# Patient Record
Sex: FEMALE | Race: BLACK OR AFRICAN AMERICAN | NOT HISPANIC OR LATINO
[De-identification: names, ages, dates, MRNs, and addresses within clinical notes are randomized per-mention and may not be internally consistent; named-entity substitution may affect disease eponyms.]

---

## 2017-01-05 ENCOUNTER — MEDICATION RENEWAL (OUTPATIENT)
Age: 67
End: 2017-01-05

## 2017-03-09 ENCOUNTER — APPOINTMENT (OUTPATIENT)
Dept: ENDOCRINOLOGY | Facility: CLINIC | Age: 67
End: 2017-03-09

## 2017-03-09 VITALS
WEIGHT: 178 LBS | BODY MASS INDEX: 31.54 KG/M2 | HEART RATE: 81 BPM | OXYGEN SATURATION: 98 % | HEIGHT: 63 IN | DIASTOLIC BLOOD PRESSURE: 82 MMHG | SYSTOLIC BLOOD PRESSURE: 126 MMHG

## 2017-03-10 LAB
CREAT SPEC-SCNC: 19 MG/DL
MICROALBUMIN 24H UR DL<=1MG/L-MCNC: 1.1 MG/DL
MICROALBUMIN/CREAT 24H UR-RTO: 58 UG/MG

## 2017-08-03 ENCOUNTER — EMERGENCY (EMERGENCY)
Facility: HOSPITAL | Age: 67
LOS: 1 days | Discharge: HOME | End: 2017-08-03
Attending: EMERGENCY MEDICINE
Payer: MEDICARE

## 2017-08-03 VITALS
HEART RATE: 76 BPM | SYSTOLIC BLOOD PRESSURE: 170 MMHG | DIASTOLIC BLOOD PRESSURE: 94 MMHG | OXYGEN SATURATION: 96 % | RESPIRATION RATE: 20 BRPM | TEMPERATURE: 100 F

## 2017-08-03 VITALS
SYSTOLIC BLOOD PRESSURE: 163 MMHG | HEART RATE: 67 BPM | DIASTOLIC BLOOD PRESSURE: 80 MMHG | TEMPERATURE: 99 F | RESPIRATION RATE: 20 BRPM | OXYGEN SATURATION: 100 %

## 2017-08-03 PROCEDURE — 99283 EMERGENCY DEPT VISIT LOW MDM: CPT

## 2017-08-03 PROCEDURE — 99283 EMERGENCY DEPT VISIT LOW MDM: CPT | Mod: 25

## 2017-08-03 RX ORDER — AZITHROMYCIN 500 MG/1
500 TABLET, FILM COATED ORAL ONCE
Qty: 0 | Refills: 0 | Status: COMPLETED | OUTPATIENT
Start: 2017-08-03 | End: 2017-08-03

## 2017-08-03 RX ORDER — AZITHROMYCIN 500 MG/1
1 TABLET, FILM COATED ORAL
Qty: 2 | Refills: 0 | OUTPATIENT
Start: 2017-08-03 | End: 2017-08-05

## 2017-08-03 RX ORDER — OXYCODONE HYDROCHLORIDE 5 MG/1
5 TABLET ORAL ONCE
Qty: 0 | Refills: 0 | Status: DISCONTINUED | OUTPATIENT
Start: 2017-08-03 | End: 2017-08-03

## 2017-08-03 RX ORDER — OXYCODONE HYDROCHLORIDE 5 MG/1
1 TABLET ORAL
Qty: 12 | Refills: 0 | OUTPATIENT
Start: 2017-08-03 | End: 2017-08-06

## 2017-08-03 RX ADMIN — AZITHROMYCIN 500 MILLIGRAM(S): 500 TABLET, FILM COATED ORAL at 05:30

## 2017-08-03 RX ADMIN — OXYCODONE HYDROCHLORIDE 5 MILLIGRAM(S): 5 TABLET ORAL at 03:48

## 2017-08-03 NOTE — ED PROVIDER NOTE - ENMT, MLM
Airway patent, Nasal mucosa clear. Mouth with normal mucosa. Throat has no vesicles, no oropharyngeal exudates and uvula is midline. Right lower posterior gum line oozing fresh blood and suture posterior to last molar.

## 2017-08-03 NOTE — ED ADULT NURSE NOTE - DISCHARGE TEACHING
Reviewed discharge instructions, follow up care, and when to return to ED - Pt verbalized understanding.

## 2017-08-03 NOTE — ED PROVIDER NOTE - OBJECTIVE STATEMENT
s/p right lower posterior molar extraction today. given tylenol only. given clindamycin a few days ago which gave her pain so she stopped taking. s/p right lower posterior molar extraction today. Gradual onset and worsening "aching" pain. Given Tylenol only for pain. Prescribed prophylactic clindamycin a few days ago which gave her pain so she stopped taking. Able to swallow. No fever. No drooling. No trouble breathing.

## 2017-08-03 NOTE — ED PROVIDER NOTE - MEDICAL DECISION MAKING DETAILS
Dental pain s/p tooth extraction. No respiratory problems. Afebrile. PO Oxycodone given in ED with improvement.

## 2017-08-03 NOTE — ED ADULT NURSE NOTE - PMH
Adult Hypothyroidism    Asthma    CAD (Coronary Artery Disease)    Cardiomegaly    Chronic CHF    CVA (Cerebral Infarction)  CVA x 2  2007 and 10/2013  DJD (degenerative joint disease) of lumbar spine    DM (Diabetes Mellitus Screen)    Dysthymia    GERD (gastroesophageal reflux disease)    HTN (Hypertension)    Hyperlipidemia    Hyperuricemia    MI (myocardial infarction)  1993, 2011  NSTEMI (non-ST elevation myocardial infarction)    PVD (peripheral vascular disease)    Thalassemia    Vitamin D deficiency

## 2017-08-03 NOTE — ED ADULT NURSE NOTE - OBJECTIVE STATEMENT
67 year old female  2 days post tooth extraction presents to he ED complaining of tooth pain. pt is A&O x 4, VSS, afebrile. Pt has a defibrilator in place and a Hx of stroke and MO, at this time Pt denies Nausea, SOB, or CP. Pt is A&O x 4, VSS, afebrile, ambulating independently. 67 year old female  2 days post tooth extraction presents to he ED complaining of tooth pain. pt is A&O x 4, VSS, afebrile. Pt has a defibrillator in place and a Hx of stroke and MO, at this time Pt denies Nausea, SOB, or CP. Pt is A&O x 4, VSS, afebrile, ambulating independently. Pt states that her cardiologist cleared her for the extraction and prescribed her antibiotics to be taken for the procedure that she didn't take because she was allergic to them.

## 2017-08-10 ENCOUNTER — APPOINTMENT (OUTPATIENT)
Dept: ENDOCRINOLOGY | Facility: CLINIC | Age: 67
End: 2017-08-10
Payer: MEDICARE

## 2017-08-10 VITALS
HEIGHT: 63 IN | HEART RATE: 79 BPM | OXYGEN SATURATION: 97 % | DIASTOLIC BLOOD PRESSURE: 76 MMHG | BODY MASS INDEX: 31.71 KG/M2 | WEIGHT: 179 LBS | SYSTOLIC BLOOD PRESSURE: 126 MMHG

## 2017-08-10 DIAGNOSIS — E11.40 TYPE 2 DIABETES MELLITUS WITH DIABETIC NEUROPATHY, UNSPECIFIED: ICD-10-CM

## 2017-08-10 LAB
GLUCOSE BLDC GLUCOMTR-MCNC: 100
HBA1C MFR BLD HPLC: 7.5

## 2017-08-10 PROCEDURE — 99214 OFFICE O/P EST MOD 30 MIN: CPT | Mod: 25

## 2017-08-10 PROCEDURE — 83036 HEMOGLOBIN GLYCOSYLATED A1C: CPT | Mod: QW

## 2017-08-10 PROCEDURE — 82962 GLUCOSE BLOOD TEST: CPT

## 2017-10-29 ENCOUNTER — EMERGENCY (EMERGENCY)
Facility: HOSPITAL | Age: 67
LOS: 0 days | Discharge: HOME | End: 2017-10-29
Attending: EMERGENCY MEDICINE
Payer: MEDICARE

## 2017-10-29 VITALS
DIASTOLIC BLOOD PRESSURE: 76 MMHG | SYSTOLIC BLOOD PRESSURE: 140 MMHG | RESPIRATION RATE: 16 BRPM | HEART RATE: 60 BPM | TEMPERATURE: 98 F | OXYGEN SATURATION: 99 %

## 2017-10-29 VITALS
WEIGHT: 184.97 LBS | OXYGEN SATURATION: 99 % | HEIGHT: 63 IN | TEMPERATURE: 98 F | HEART RATE: 68 BPM | SYSTOLIC BLOOD PRESSURE: 133 MMHG | RESPIRATION RATE: 18 BRPM | DIASTOLIC BLOOD PRESSURE: 75 MMHG

## 2017-10-29 PROCEDURE — 99284 EMERGENCY DEPT VISIT MOD MDM: CPT

## 2017-10-29 PROCEDURE — 70450 CT HEAD/BRAIN W/O DYE: CPT | Mod: 26

## 2017-10-29 PROCEDURE — 73564 X-RAY EXAM KNEE 4 OR MORE: CPT | Mod: 26,LT

## 2017-10-29 NOTE — ED PROVIDER NOTE - OBJECTIVE STATEMENT
68 y/o female hx htn, dm, cad/cva, afib on xarelto and with defibrillator c/o mechanical fall today while walking. States left knee gave out and fell face forward and hit front of face. No loc, no dizziness, neck pain, neuro symptoms. Pt central right maxillary denture fell out which pt has with her, no other facial trauma. No vision changes, no cp, sob, abd pain, hip pain, extremity pain other than at left knee. Pt c/o left knee pain ,no other distal/proximal pain. Stood up after accident but with pain.

## 2017-10-29 NOTE — ED PROVIDER NOTE - CARE PLAN
Principal Discharge DX:	Head injury  Secondary Diagnosis:	Other closed fracture of left patella, initial encounter

## 2017-10-29 NOTE — ED ADULT TRIAGE NOTE - CHIEF COMPLAINT QUOTE
c/o l knee pain/swelling and forehead pain s/p trip and fall at store denies loc or dizziness hit forehead, nose and r front tooth dislodged pt on xarelto

## 2017-10-29 NOTE — ED PROVIDER NOTE - MEDICAL DECISION MAKING DETAILS
mechanical fall, on xarelto, no head trauma but ct head given a/c. left knee pain/swelling, no sign of knee dislocation. will get xray. has likely effusion. possibly ligamentous damage though no sign of significant laxity. perfused distally. possibly immobilize and crutches with ortho f/u if neg.

## 2017-10-29 NOTE — ED PROVIDER NOTE - PROGRESS NOTE DETAILS
Divya: explained xray results to pt, explained would like to do ct of knee, pt refusing, states will just follow up. given ct results, knee immobilizer, crutch instructions, orthopedics. return precautions. tylenol/motrin/ice. will f/u. no weight bearing until see ortho.

## 2017-10-30 DIAGNOSIS — I25.2 OLD MYOCARDIAL INFARCTION: ICD-10-CM

## 2017-10-30 DIAGNOSIS — I50.9 HEART FAILURE, UNSPECIFIED: ICD-10-CM
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
## 2017-10-30 DIAGNOSIS — S82.002A UNSPECIFIED FRACTURE OF LEFT PATELLA, INITIAL ENCOUNTER FOR CLOSED FRACTURE: ICD-10-CM

## 2017-10-30 DIAGNOSIS — M25.562 PAIN IN LEFT KNEE: ICD-10-CM
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
## 2017-10-30 DIAGNOSIS — J45.909 UNSPECIFIED ASTHMA, UNCOMPLICATED: ICD-10-CM

## 2017-10-30 DIAGNOSIS — E03.9 HYPOTHYROIDISM, UNSPECIFIED: ICD-10-CM

## 2017-10-30 DIAGNOSIS — I25.9 CHRONIC ISCHEMIC HEART DISEASE, UNSPECIFIED: ICD-10-CM

## 2017-10-30 DIAGNOSIS — E78.5 HYPERLIPIDEMIA, UNSPECIFIED: ICD-10-CM

## 2017-10-30 DIAGNOSIS — S09.90XA UNSPECIFIED INJURY OF HEAD, INITIAL ENCOUNTER: ICD-10-CM

## 2017-10-30 DIAGNOSIS — I10 ESSENTIAL (PRIMARY) HYPERTENSION: ICD-10-CM

## 2017-10-30 DIAGNOSIS — K21.9 GASTRO-ESOPHAGEAL REFLUX DISEASE WITHOUT ESOPHAGITIS: ICD-10-CM

## 2017-10-30 DIAGNOSIS — Z79.2 LONG TERM (CURRENT) USE OF ANTIBIOTICS: ICD-10-CM

## 2017-10-30 DIAGNOSIS — Z88.0 ALLERGY STATUS TO PENICILLIN: ICD-10-CM

## 2017-10-30 DIAGNOSIS — Z79.84 LONG TERM (CURRENT) USE OF ORAL HYPOGLYCEMIC DRUGS: ICD-10-CM

## 2017-10-31 NOTE — ED PROCEDURE NOTE - NS ED PERI VASCULAR NEG
no paresthesia/capillary refill time < 2 seconds/fingers/toes warm to touch/no swelling/no cyanosis of extremity

## 2017-11-06 ENCOUNTER — APPOINTMENT (OUTPATIENT)
Dept: ORTHOPEDIC SURGERY | Facility: CLINIC | Age: 67
End: 2017-11-06
Payer: MEDICARE

## 2017-11-06 VITALS
HEIGHT: 65 IN | DIASTOLIC BLOOD PRESSURE: 80 MMHG | WEIGHT: 187 LBS | BODY MASS INDEX: 31.16 KG/M2 | HEART RATE: 74 BPM | SYSTOLIC BLOOD PRESSURE: 144 MMHG

## 2017-11-06 PROCEDURE — 73564 X-RAY EXAM KNEE 4 OR MORE: CPT | Mod: LT

## 2017-11-06 PROCEDURE — 99204 OFFICE O/P NEW MOD 45 MIN: CPT

## 2017-11-08 ENCOUNTER — CHART COPY (OUTPATIENT)
Age: 67
End: 2017-11-08

## 2017-11-20 ENCOUNTER — APPOINTMENT (OUTPATIENT)
Dept: ORTHOPEDIC SURGERY | Facility: CLINIC | Age: 67
End: 2017-11-20
Payer: MEDICARE

## 2017-11-20 VITALS — HEIGHT: 65 IN | BODY MASS INDEX: 31.16 KG/M2 | WEIGHT: 187 LBS

## 2017-11-20 PROCEDURE — 99214 OFFICE O/P EST MOD 30 MIN: CPT

## 2017-11-20 PROCEDURE — 73564 X-RAY EXAM KNEE 4 OR MORE: CPT | Mod: LT

## 2017-12-12 ENCOUNTER — APPOINTMENT (OUTPATIENT)
Dept: ORTHOPEDIC SURGERY | Facility: CLINIC | Age: 67
End: 2017-12-12
Payer: MEDICARE

## 2017-12-12 VITALS
WEIGHT: 187 LBS | SYSTOLIC BLOOD PRESSURE: 160 MMHG | HEIGHT: 65 IN | HEART RATE: 80 BPM | DIASTOLIC BLOOD PRESSURE: 80 MMHG | BODY MASS INDEX: 31.16 KG/M2

## 2017-12-12 PROCEDURE — 73564 X-RAY EXAM KNEE 4 OR MORE: CPT | Mod: LT

## 2017-12-12 PROCEDURE — 99214 OFFICE O/P EST MOD 30 MIN: CPT

## 2017-12-28 ENCOUNTER — APPOINTMENT (OUTPATIENT)
Dept: ENDOCRINOLOGY | Facility: CLINIC | Age: 67
End: 2017-12-28

## 2018-01-17 ENCOUNTER — RX RENEWAL (OUTPATIENT)
Age: 68
End: 2018-01-17

## 2018-01-24 ENCOUNTER — APPOINTMENT (OUTPATIENT)
Dept: ORTHOPEDIC SURGERY | Facility: CLINIC | Age: 68
End: 2018-01-24
Payer: MEDICARE

## 2018-01-24 VITALS
HEART RATE: 83 BPM | HEIGHT: 65 IN | DIASTOLIC BLOOD PRESSURE: 83 MMHG | WEIGHT: 182 LBS | SYSTOLIC BLOOD PRESSURE: 125 MMHG | BODY MASS INDEX: 30.32 KG/M2

## 2018-01-24 PROCEDURE — 99213 OFFICE O/P EST LOW 20 MIN: CPT

## 2018-01-24 PROCEDURE — 73564 X-RAY EXAM KNEE 4 OR MORE: CPT | Mod: LT

## 2018-02-13 ENCOUNTER — APPOINTMENT (OUTPATIENT)
Dept: ORTHOPEDIC SURGERY | Facility: CLINIC | Age: 68
End: 2018-02-13

## 2018-03-08 ENCOUNTER — APPOINTMENT (OUTPATIENT)
Dept: ENDOCRINOLOGY | Facility: CLINIC | Age: 68
End: 2018-03-08
Payer: MEDICARE

## 2018-03-08 VITALS
WEIGHT: 179 LBS | DIASTOLIC BLOOD PRESSURE: 72 MMHG | SYSTOLIC BLOOD PRESSURE: 118 MMHG | BODY MASS INDEX: 29.82 KG/M2 | OXYGEN SATURATION: 97 % | HEIGHT: 65 IN | HEART RATE: 85 BPM

## 2018-03-08 PROCEDURE — 99215 OFFICE O/P EST HI 40 MIN: CPT

## 2018-03-08 RX ORDER — HYDROCODONE BITARTRATE AND ACETAMINOPHEN 5; 300 MG/1; MG/1
5-300 TABLET ORAL
Qty: 18 | Refills: 0 | Status: DISCONTINUED | COMMUNITY
Start: 2017-08-04 | End: 2018-03-08

## 2018-03-08 RX ORDER — SACUBITRIL AND VALSARTAN 24; 26 MG/1; MG/1
24-26 TABLET, FILM COATED ORAL
Qty: 4 | Refills: 0 | Status: DISCONTINUED | COMMUNITY
Start: 2017-08-26 | End: 2018-03-08

## 2018-03-08 RX ORDER — CLINDAMYCIN HYDROCHLORIDE 150 MG/1
150 CAPSULE ORAL EVERY 6 HOURS
Qty: 28 | Refills: 0 | Status: DISCONTINUED | COMMUNITY
Start: 2017-07-28 | End: 2018-03-08

## 2018-03-08 RX ORDER — OXYCODONE HYDROCHLORIDE 5 MG/1
5 CAPSULE ORAL
Qty: 12 | Refills: 0 | Status: DISCONTINUED | COMMUNITY
Start: 2017-08-03 | End: 2018-03-08

## 2018-03-08 RX ORDER — AZITHROMYCIN 250 MG/1
250 TABLET, FILM COATED ORAL
Qty: 6 | Refills: 0 | Status: DISCONTINUED | COMMUNITY
Start: 2017-08-03 | End: 2018-03-08

## 2018-03-08 RX ORDER — AZITHROMYCIN 500 MG/1
500 TABLET, FILM COATED ORAL
Qty: 2 | Refills: 0 | Status: DISCONTINUED | COMMUNITY
Start: 2017-08-03 | End: 2018-03-08

## 2018-03-27 ENCOUNTER — APPOINTMENT (OUTPATIENT)
Dept: ORTHOPEDIC SURGERY | Facility: CLINIC | Age: 68
End: 2018-03-27
Payer: MEDICARE

## 2018-03-27 VITALS — HEIGHT: 65 IN | WEIGHT: 180 LBS | BODY MASS INDEX: 29.99 KG/M2

## 2018-03-27 DIAGNOSIS — M25.561 PAIN IN RIGHT KNEE: ICD-10-CM

## 2018-03-27 DIAGNOSIS — G89.29 PAIN IN RIGHT KNEE: ICD-10-CM

## 2018-03-27 DIAGNOSIS — S82.045D NONDISPLACED COMMINUTED FRACTURE OF LEFT PATELLA, SUBSEQUENT ENCOUNTER FOR CLOSED FRACTURE WITH ROUTINE HEALING: ICD-10-CM

## 2018-03-27 DIAGNOSIS — M25.562 PAIN IN RIGHT KNEE: ICD-10-CM

## 2018-03-27 DIAGNOSIS — S82.035A NONDISPLACED TRANSVERSE FRACTURE OF LEFT PATELLA, INITIAL ENCOUNTER FOR CLOSED FRACTURE: ICD-10-CM

## 2018-03-27 PROCEDURE — 73564 X-RAY EXAM KNEE 4 OR MORE: CPT | Mod: LT

## 2018-03-27 PROCEDURE — 99214 OFFICE O/P EST MOD 30 MIN: CPT

## 2018-06-20 ENCOUNTER — MEDICATION RENEWAL (OUTPATIENT)
Age: 68
End: 2018-06-20

## 2018-07-26 ENCOUNTER — MEDICATION RENEWAL (OUTPATIENT)
Age: 68
End: 2018-07-26

## 2018-09-13 ENCOUNTER — APPOINTMENT (OUTPATIENT)
Dept: ENDOCRINOLOGY | Facility: CLINIC | Age: 68
End: 2018-09-13
Payer: MEDICARE

## 2018-09-13 VITALS
HEART RATE: 80 BPM | WEIGHT: 181 LBS | SYSTOLIC BLOOD PRESSURE: 140 MMHG | OXYGEN SATURATION: 98 % | BODY MASS INDEX: 30.16 KG/M2 | DIASTOLIC BLOOD PRESSURE: 62 MMHG | HEIGHT: 65 IN

## 2018-09-13 VITALS — HEIGHT: 61.9 IN | WEIGHT: 176 LBS | BODY MASS INDEX: 32.39 KG/M2

## 2018-09-13 PROCEDURE — 77085 DXA BONE DENSITY AXL VRT FX: CPT

## 2018-09-13 PROCEDURE — 99215 OFFICE O/P EST HI 40 MIN: CPT | Mod: 25

## 2019-01-24 ENCOUNTER — INPATIENT (INPATIENT)
Facility: HOSPITAL | Age: 69
LOS: 6 days | Discharge: HOME | DRG: 291 | End: 2019-01-31
Attending: INTERNAL MEDICINE | Admitting: INTERNAL MEDICINE
Payer: MEDICARE

## 2019-01-24 VITALS
DIASTOLIC BLOOD PRESSURE: 94 MMHG | HEART RATE: 96 BPM | OXYGEN SATURATION: 98 % | RESPIRATION RATE: 26 BRPM | TEMPERATURE: 99 F | SYSTOLIC BLOOD PRESSURE: 161 MMHG | WEIGHT: 177.03 LBS | HEIGHT: 64 IN

## 2019-01-24 DIAGNOSIS — I50.43 ACUTE ON CHRONIC COMBINED SYSTOLIC (CONGESTIVE) AND DIASTOLIC (CONGESTIVE) HEART FAILURE: ICD-10-CM

## 2019-01-24 LAB
ALBUMIN SERPL ELPH-MCNC: 4.3 G/DL — SIGNIFICANT CHANGE UP (ref 3.3–5)
ALP SERPL-CCNC: 79 U/L — SIGNIFICANT CHANGE UP (ref 40–120)
ALT FLD-CCNC: 9 U/L — LOW (ref 10–45)
ANION GAP SERPL CALC-SCNC: 16 MMOL/L — SIGNIFICANT CHANGE UP (ref 5–17)
APTT BLD: 37.9 SEC — HIGH (ref 27.5–36.3)
AST SERPL-CCNC: 24 U/L — SIGNIFICANT CHANGE UP (ref 10–40)
BASE EXCESS BLDV CALC-SCNC: 0.5 MMOL/L — SIGNIFICANT CHANGE UP (ref -2–2)
BASOPHILS # BLD AUTO: 0 K/UL — SIGNIFICANT CHANGE UP (ref 0–0.2)
BASOPHILS NFR BLD AUTO: 0.2 % — SIGNIFICANT CHANGE UP (ref 0–2)
BILIRUB SERPL-MCNC: 0.9 MG/DL — SIGNIFICANT CHANGE UP (ref 0.2–1.2)
BUN SERPL-MCNC: 17 MG/DL — SIGNIFICANT CHANGE UP (ref 7–23)
CA-I SERPL-SCNC: 1.24 MMOL/L — SIGNIFICANT CHANGE UP (ref 1.12–1.3)
CALCIUM SERPL-MCNC: 10 MG/DL — SIGNIFICANT CHANGE UP (ref 8.4–10.5)
CHLORIDE BLDV-SCNC: 110 MMOL/L — HIGH (ref 96–108)
CHLORIDE SERPL-SCNC: 107 MMOL/L — SIGNIFICANT CHANGE UP (ref 96–108)
CO2 BLDV-SCNC: 25 MMOL/L — SIGNIFICANT CHANGE UP (ref 22–30)
CO2 SERPL-SCNC: 22 MMOL/L — SIGNIFICANT CHANGE UP (ref 22–31)
CREAT SERPL-MCNC: 0.83 MG/DL — SIGNIFICANT CHANGE UP (ref 0.5–1.3)
EOSINOPHIL # BLD AUTO: 0.1 K/UL — SIGNIFICANT CHANGE UP (ref 0–0.5)
EOSINOPHIL NFR BLD AUTO: 1 % — SIGNIFICANT CHANGE UP (ref 0–6)
GAS PNL BLDV: 143 MMOL/L — SIGNIFICANT CHANGE UP (ref 136–145)
GAS PNL BLDV: SIGNIFICANT CHANGE UP
GLUCOSE BLDV-MCNC: 120 MG/DL — HIGH (ref 70–99)
GLUCOSE SERPL-MCNC: 124 MG/DL — HIGH (ref 70–99)
HCO3 BLDV-SCNC: 24 MMOL/L — SIGNIFICANT CHANGE UP (ref 21–29)
HCT VFR BLD CALC: 42.3 % — SIGNIFICANT CHANGE UP (ref 34.5–45)
HCT VFR BLDA CALC: 43 % — SIGNIFICANT CHANGE UP (ref 39–50)
HGB BLD CALC-MCNC: 14.1 G/DL — SIGNIFICANT CHANGE UP (ref 11.5–15.5)
HGB BLD-MCNC: 14.2 G/DL — SIGNIFICANT CHANGE UP (ref 11.5–15.5)
INR BLD: 1.55 RATIO — HIGH (ref 0.88–1.16)
LACTATE BLDV-MCNC: 1.4 MMOL/L — SIGNIFICANT CHANGE UP (ref 0.7–2)
LYMPHOCYTES # BLD AUTO: 1.3 K/UL — SIGNIFICANT CHANGE UP (ref 1–3.3)
LYMPHOCYTES # BLD AUTO: 17.6 % — SIGNIFICANT CHANGE UP (ref 13–44)
MCHC RBC-ENTMCNC: 26.2 PG — LOW (ref 27–34)
MCHC RBC-ENTMCNC: 33.5 GM/DL — SIGNIFICANT CHANGE UP (ref 32–36)
MCV RBC AUTO: 78.1 FL — LOW (ref 80–100)
MONOCYTES # BLD AUTO: 0.5 K/UL — SIGNIFICANT CHANGE UP (ref 0–0.9)
MONOCYTES NFR BLD AUTO: 7.1 % — SIGNIFICANT CHANGE UP (ref 2–14)
NEUTROPHILS # BLD AUTO: 5.6 K/UL — SIGNIFICANT CHANGE UP (ref 1.8–7.4)
NEUTROPHILS NFR BLD AUTO: 74 % — SIGNIFICANT CHANGE UP (ref 43–77)
NT-PROBNP SERPL-SCNC: 3233 PG/ML — HIGH (ref 0–300)
OTHER CELLS CSF MANUAL: 15 ML/DL — LOW (ref 18–22)
PCO2 BLDV: 34 MMHG — LOW (ref 35–50)
PH BLDV: 7.45 — SIGNIFICANT CHANGE UP (ref 7.35–7.45)
PLATELET # BLD AUTO: 108 K/UL — LOW (ref 150–400)
PO2 BLDV: 40 MMHG — SIGNIFICANT CHANGE UP (ref 25–45)
POTASSIUM BLDV-SCNC: 2.9 MMOL/L — CRITICAL LOW (ref 3.5–5.3)
POTASSIUM SERPL-MCNC: 3.1 MMOL/L — LOW (ref 3.5–5.3)
POTASSIUM SERPL-SCNC: 3.1 MMOL/L — LOW (ref 3.5–5.3)
PROT SERPL-MCNC: 7.9 G/DL — SIGNIFICANT CHANGE UP (ref 6–8.3)
PROTHROM AB SERPL-ACNC: 18.1 SEC — HIGH (ref 10–12.9)
RAPID RVP RESULT: SIGNIFICANT CHANGE UP
RBC # BLD: 5.41 M/UL — HIGH (ref 3.8–5.2)
RBC # FLD: 13.6 % — SIGNIFICANT CHANGE UP (ref 10.3–14.5)
SAO2 % BLDV: 76 % — SIGNIFICANT CHANGE UP (ref 67–88)
SODIUM SERPL-SCNC: 145 MMOL/L — SIGNIFICANT CHANGE UP (ref 135–145)
TROPONIN T, HIGH SENSITIVITY RESULT: 20 NG/L — SIGNIFICANT CHANGE UP (ref 0–51)
TROPONIN T, HIGH SENSITIVITY RESULT: 21 NG/L — SIGNIFICANT CHANGE UP (ref 0–51)
WBC # BLD: 7.5 K/UL — SIGNIFICANT CHANGE UP (ref 3.8–10.5)
WBC # FLD AUTO: 7.5 K/UL — SIGNIFICANT CHANGE UP (ref 3.8–10.5)

## 2019-01-24 PROCEDURE — 93010 ELECTROCARDIOGRAM REPORT: CPT | Mod: 76

## 2019-01-24 PROCEDURE — 99234 HOSP IP/OBS SM DT SF/LOW 45: CPT

## 2019-01-24 PROCEDURE — 93306 TTE W/DOPPLER COMPLETE: CPT | Mod: 26

## 2019-01-24 PROCEDURE — 71045 X-RAY EXAM CHEST 1 VIEW: CPT | Mod: 26

## 2019-01-24 PROCEDURE — 99223 1ST HOSP IP/OBS HIGH 75: CPT

## 2019-01-24 RX ORDER — INSULIN LISPRO 100/ML
VIAL (ML) SUBCUTANEOUS
Qty: 0 | Refills: 0 | Status: DISCONTINUED | OUTPATIENT
Start: 2019-01-24 | End: 2019-01-31

## 2019-01-24 RX ORDER — FUROSEMIDE 40 MG
40 TABLET ORAL ONCE
Qty: 0 | Refills: 0 | Status: COMPLETED | OUTPATIENT
Start: 2019-01-24 | End: 2019-01-24

## 2019-01-24 RX ORDER — DEXTROSE 50 % IN WATER 50 %
12.5 SYRINGE (ML) INTRAVENOUS ONCE
Qty: 0 | Refills: 0 | Status: DISCONTINUED | OUTPATIENT
Start: 2019-01-24 | End: 2019-01-31

## 2019-01-24 RX ORDER — FUROSEMIDE 40 MG
40 TABLET ORAL DAILY
Qty: 0 | Refills: 0 | Status: DISCONTINUED | OUTPATIENT
Start: 2019-01-24 | End: 2019-01-25

## 2019-01-24 RX ORDER — DEXTROSE 50 % IN WATER 50 %
25 SYRINGE (ML) INTRAVENOUS ONCE
Qty: 0 | Refills: 0 | Status: DISCONTINUED | OUTPATIENT
Start: 2019-01-24 | End: 2019-01-31

## 2019-01-24 RX ORDER — SACUBITRIL AND VALSARTAN 24; 26 MG/1; MG/1
1 TABLET, FILM COATED ORAL
Qty: 0 | Refills: 0 | Status: DISCONTINUED | OUTPATIENT
Start: 2019-01-24 | End: 2019-01-31

## 2019-01-24 RX ORDER — AMLODIPINE BESYLATE 2.5 MG/1
5 TABLET ORAL DAILY
Qty: 0 | Refills: 0 | Status: DISCONTINUED | OUTPATIENT
Start: 2019-01-24 | End: 2019-01-31

## 2019-01-24 RX ORDER — ASPIRIN/CALCIUM CARB/MAGNESIUM 324 MG
81 TABLET ORAL DAILY
Qty: 0 | Refills: 0 | Status: DISCONTINUED | OUTPATIENT
Start: 2019-01-24 | End: 2019-01-31

## 2019-01-24 RX ORDER — CARVEDILOL PHOSPHATE 80 MG/1
25 CAPSULE, EXTENDED RELEASE ORAL EVERY 12 HOURS
Qty: 0 | Refills: 0 | Status: DISCONTINUED | OUTPATIENT
Start: 2019-01-24 | End: 2019-01-31

## 2019-01-24 RX ORDER — PANTOPRAZOLE SODIUM 20 MG/1
40 TABLET, DELAYED RELEASE ORAL
Qty: 0 | Refills: 0 | Status: DISCONTINUED | OUTPATIENT
Start: 2019-01-24 | End: 2019-01-31

## 2019-01-24 RX ORDER — RIVAROXABAN 15 MG-20MG
20 KIT ORAL EVERY 24 HOURS
Qty: 0 | Refills: 0 | Status: DISCONTINUED | OUTPATIENT
Start: 2019-01-24 | End: 2019-01-31

## 2019-01-24 RX ORDER — SODIUM CHLORIDE 9 MG/ML
1000 INJECTION, SOLUTION INTRAVENOUS
Qty: 0 | Refills: 0 | Status: DISCONTINUED | OUTPATIENT
Start: 2019-01-24 | End: 2019-01-31

## 2019-01-24 RX ORDER — DEXTROSE 50 % IN WATER 50 %
15 SYRINGE (ML) INTRAVENOUS ONCE
Qty: 0 | Refills: 0 | Status: DISCONTINUED | OUTPATIENT
Start: 2019-01-24 | End: 2019-01-31

## 2019-01-24 RX ORDER — POTASSIUM CHLORIDE 20 MEQ
40 PACKET (EA) ORAL ONCE
Qty: 0 | Refills: 0 | Status: COMPLETED | OUTPATIENT
Start: 2019-01-24 | End: 2019-01-24

## 2019-01-24 RX ORDER — REPAGLINIDE 1 MG/1
0 TABLET ORAL
Qty: 0 | Refills: 0 | COMMUNITY

## 2019-01-24 RX ORDER — LEVOTHYROXINE SODIUM 125 MCG
112 TABLET ORAL DAILY
Qty: 0 | Refills: 0 | Status: DISCONTINUED | OUTPATIENT
Start: 2019-01-24 | End: 2019-01-31

## 2019-01-24 RX ORDER — SACUBITRIL AND VALSARTAN 24; 26 MG/1; MG/1
1 TABLET, FILM COATED ORAL
Qty: 0 | Refills: 0 | COMMUNITY

## 2019-01-24 RX ORDER — ROSUVASTATIN CALCIUM 5 MG/1
1 TABLET ORAL
Qty: 0 | Refills: 0 | COMMUNITY

## 2019-01-24 RX ORDER — BUDESONIDE AND FORMOTEROL FUMARATE DIHYDRATE 160; 4.5 UG/1; UG/1
2 AEROSOL RESPIRATORY (INHALATION)
Qty: 0 | Refills: 0 | Status: DISCONTINUED | OUTPATIENT
Start: 2019-01-24 | End: 2019-01-31

## 2019-01-24 RX ORDER — GLUCAGON INJECTION, SOLUTION 0.5 MG/.1ML
1 INJECTION, SOLUTION SUBCUTANEOUS ONCE
Qty: 0 | Refills: 0 | Status: DISCONTINUED | OUTPATIENT
Start: 2019-01-24 | End: 2019-01-31

## 2019-01-24 RX ADMIN — CARVEDILOL PHOSPHATE 25 MILLIGRAM(S): 80 CAPSULE, EXTENDED RELEASE ORAL at 17:38

## 2019-01-24 RX ADMIN — Medication 40 MILLIEQUIVALENT(S): at 11:58

## 2019-01-24 RX ADMIN — AMLODIPINE BESYLATE 5 MILLIGRAM(S): 2.5 TABLET ORAL at 17:38

## 2019-01-24 RX ADMIN — Medication 40 MILLIGRAM(S): at 10:08

## 2019-01-24 RX ADMIN — Medication 1: at 18:31

## 2019-01-24 RX ADMIN — BUDESONIDE AND FORMOTEROL FUMARATE DIHYDRATE 2 PUFF(S): 160; 4.5 AEROSOL RESPIRATORY (INHALATION) at 22:56

## 2019-01-24 RX ADMIN — SACUBITRIL AND VALSARTAN 1 TABLET(S): 24; 26 TABLET, FILM COATED ORAL at 22:56

## 2019-01-24 RX ADMIN — Medication 81 MILLIGRAM(S): at 17:38

## 2019-01-24 RX ADMIN — RIVAROXABAN 20 MILLIGRAM(S): KIT at 22:56

## 2019-01-24 NOTE — H&P ADULT - NSHPREVIEWOFSYSTEMS_GEN_ALL_CORE
REVIEW OF SYSTEMS:  CONSTITUTIONAL: No weakness. No fevers. +chills. No rigors. No weight loss. No poor appetite.  EYES: No visual changes. No eye pain.  ENT: No hearing difficulty. No vertigo. No dysphagia. No Sinusitis/rhinorrhea.  NECK: No pain. No stiffness/rigidity.  CARDIAC: +chest pain. +palpitations.  RESPIRATORY: +cough. +SOB. No hemoptysis.  GASTROINTESTINAL: No abdominal pain. No nausea. No vomiting. No hematemesis. No diarrhea. No constipation. No melena. No hematochezia.  GENITOURINARY: No dysuria. No frequency. No hesitancy. No hematuria.  NEUROLOGICAL: No numbness/tingling. +mild left hemiparesis. No incontinence. No headache.  BACK: No back pain.  EXTREMITIES: +R>L lower extremity edema. Full ROM.  SKIN: No rashes. No itching. No other lesions.  PSYCHIATRIC: No depression. No anxiety. No SI/HI.  ALLERGIC: No lip swelling. No hives.  All other review of systems is negative unless indicated above.  Unless indicated above, unable to assess ROS 2/2

## 2019-01-24 NOTE — H&P ADULT - PROBLEM SELECTOR PLAN 1
- CXR showing pulm edema  - cont lasix 40IV qday + extra 40IV dose tonight  - tele  - TTE showing ?comparable/grossly unchanged EF 26%, bivHF, mod MR  - trend CE  - consider thoracic surg eval for mitraclip/repair  - replete potassium  - cont coreg for CHF and NSVTs

## 2019-01-24 NOTE — H&P ADULT - ASSESSMENT
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

## 2019-01-24 NOTE — ED ADULT NURSE NOTE - NSIMPLEMENTINTERV_GEN_ALL_ED
Implemented All Universal Safety Interventions:  [place] to call system. Call bell, personal items and telephone within reach. Instruct patient to call for assistance. Room bathroom lighting operational. Non-slip footwear when patient is off stretcher. Physically safe environment: no spills, clutter or unnecessary equipment. Stretcher in lowest position, wheels locked, appropriate side rails in place.

## 2019-01-24 NOTE — ED CDU PROVIDER DISPOSITION NOTE - CLINICAL COURSE
69yof PMH of HTN, HLD, T2DM, CHF (combined systolic and diastolic with EF 30% in 2014). hypothyroidism, CVA 07, and 10/13 residual weakness and expressive aphasia, GERD, ischemic cardiomyopathy s/p AICD in 11/15, CAD, on plavix presents with SOB.  Last admission in Feb 2016 for CHF exacerbation. here for sob and kraus for few days now. pt states that she noticed increasing swelling to b/l LE- mild, since yesterday. pt also reports that she has been having intermittent chest burning- has had in past, associated with bending over, pt reports that she has seen her doctor for this in the past.   pt in CDU developed non-sustained V tach 6 secs, pt had palpitations during incidence. pt continued to have KRAUS, CP on exertion, despite IV lasix. pt admitted to [facility].

## 2019-01-24 NOTE — ED CDU PROVIDER INITIAL DAY NOTE - PROGRESS NOTE DETAILS
CDU NOTE DAR Perales: received call from tele room that pt had 6 sec episode of non-sustained VTach at about 13:48 today. pt resting comfortably now, but reports that she does recall earlier having an episode of palpitations. pt reports malaise, still having KRAUS and cp on exertion. no new complaints. NAD recent VSS. no events on tele.  ED attempted to contact pt's cardiologist Dr. Patel, awaiting callback. CDU NOTE DAR Perales: called for Cardiologist Dr. Patel, left message with service. pt's PCP- Dr[facility] Yin franklin, [facility]

## 2019-01-24 NOTE — ED CDU PROVIDER INITIAL DAY NOTE - ATTENDING CONTRIBUTION TO CARE
69yof PMH of HTN, HLD, T2DM, CHF (combined systolic and diastolic with EF 30% in 2014). hypothyroidism, CVA 07, and 10/13 residual weakness and expressive aphasia, GERD, ischemic cardiomyopathy s/p AICD in 11/15, CAD, on plavix presents with SOB.  Last admission in Feb 2016 for CHF exacerbation. here for sob and whelan for few days now, No active CP/SOB, Labs, Tele, EKG, CZs, reassess CDU obe=servation for Tele, repeat CXR. reassess

## 2019-01-24 NOTE — H&P ADULT - NSHPLABSRESULTS_GEN_ALL_CORE
Personally reviewed labs.   Personally reviewed imaging.   Personally reviewed EKG.                           14.2   7.5   )-----------( 108      ( 24 Jan 2019 10:24 )             42.3       01-24    145  |  107  |  17  ----------------------------<  124<H>  3.1<L>   |  22  |  0.83    Ca    10.0      24 Jan 2019 10:24    TPro  7.9  /  Alb  4.3  /  TBili  0.9  /  DBili  x   /  AST  24  /  ALT  9<L>  /  AlkPhos  79  01-24            LIVER FUNCTIONS - ( 24 Jan 2019 10:24 )  Alb: 4.3 g/dL / Pro: 7.9 g/dL / ALK PHOS: 79 U/L / ALT: 9 U/L / AST: 24 U/L / GGT: x             PT/INR - ( 24 Jan 2019 10:24 )   PT: 18.1 sec;   INR: 1.55 ratio         PTT - ( 24 Jan 2019 10:24 )  PTT:37.9 sec Personally reviewed labs.   Personally reviewed imaging.   Personally reviewed EKG. NSR, rate 82, . TWI in AVL/V6. Inc LBBB. EKG unchanged from EKG in Jul 2016.                          14.2   7.5   )-----------( 108      ( 24 Jan 2019 10:24 )             42.3       01-24    145  |  107  |  17  ----------------------------<  124<H>  3.1<L>   |  22  |  0.83    Ca    10.0      24 Jan 2019 10:24    TPro  7.9  /  Alb  4.3  /  TBili  0.9  /  DBili  x   /  AST  24  /  ALT  9<L>  /  AlkPhos  79  01-24            LIVER FUNCTIONS - ( 24 Jan 2019 10:24 )  Alb: 4.3 g/dL / Pro: 7.9 g/dL / ALK PHOS: 79 U/L / ALT: 9 U/L / AST: 24 U/L / GGT: x             PT/INR - ( 24 Jan 2019 10:24 )   PT: 18.1 sec;   INR: 1.55 ratio         PTT - ( 24 Jan 2019 10:24 )  PTT:37.9 sec

## 2019-01-24 NOTE — ED CDU PROVIDER DISPOSITION NOTE - ATTENDING CONTRIBUTION TO CARE
69F with HTN, HLD, DM II, CHF, CVA, hypothyroid, s/p aicd, cad, presented to ED with SOB, KRAUS, and increased LE edema. also with intermittent chest burning. Pt to CDU for monitoring, continued diuresis, re-eval. While in CDU pt developed NSVT, and continued to have KRAUS, CP. [facility] - Skyler Nina MD

## 2019-01-24 NOTE — ED CDU PROVIDER INITIAL DAY NOTE - OBJECTIVE STATEMENT
69yof PMH of HTN, HLD, T2DM, CHF (combined systolic and diastolic with EF 30% in 2014). hypothyroidism, CVA 07, and 10/13 residual weakness and expressive aphasia, GERD, ischemic cardiomyopathy s/p AICD in 11/15, CAD, on plavix presents with SOB.  Last admission in Feb 2016 for CHF exacerbation. here for sob and whelan for few days now. pt states that she noticed increasing swelling to b/l LE- mild, since yesterday. pt also reports that she has been having intermittent chest burning- has had in past, associated with bending over, pt reports that she has seen her doctor for this in the past.   denies fever, lightheadedness, n/v/d, recent URI symptoms,

## 2019-01-24 NOTE — H&P ADULT - PROBLEM SELECTOR PLAN 2
- pepcid, protonix  - AEME as above  - may need stress test inpt. [facility] to call their cardiologists.

## 2019-01-24 NOTE — ED ADULT NURSE NOTE - OBJECTIVE STATEMENT
69 yr old female to ED C/o SOB , increased at night. Sleeps with several pillows at night +1 pedal edema Denies chest pain. Increased SOB with exertion.

## 2019-01-24 NOTE — H&P ADULT - NSHPPHYSICALEXAM_GEN_ALL_CORE
PHYSICAL EXAM:   GENERAL: Alert. Not confused. No acute distress. Not thin. Not cachectic. Not obese.  HEAD:  Atraumatic. Normocephalic.  EYES: EOMI. PERRLA. Normal conjunctiva/sclera.  ENT: Neck supple. No JVD. Moist oral mucosa. Not edentulous. No thrush.  LYMPH: Normal supraclavicular/cervical lymph nodes.   CARDIAC: Not tachy, Not kae. Regular rhythm. Not irregularly irregular. S1. S2. No murmur. No rub. No distant heart sounds.  LUNG/CHEST: CTAB. BS equal bilaterally. No wheezes. No rales. No rhonchi.  ABDOMEN: Soft. No tenderness. No distension. No fluid wave. Normal bowel sounds.  BACK: No midline/vertebral tenderness. No flank tenderness.  VASCULAR: +2 b/l radial or ulnar pulses. Palpable DP pulses.  EXTREMITIES:  No clubbing. No cyanosis. +1-2 R>L pitting edema. Moving all 4.  NEUROLOGY: A&Ox3. Non-focal exam. Cranial nerves intact. Hesitant speech, word finding difficulty. Sensation intact.  PSYCH: Normal behavior. Normal affect.  SKIN: No jaundice. No erythema. No rash/lesion.  Vascular Access:     ICU Vital Signs Last 24 Hrs  T(C): 36.6 (24 Jan 2019 21:40), Max: 37.3 (24 Jan 2019 17:29)  T(F): 97.8 (24 Jan 2019 21:40), Max: 99.1 (24 Jan 2019 17:29)  HR: 70 (24 Jan 2019 21:40) (70 - 102)  BP: 153/91 (24 Jan 2019 21:40) (133/70 - 161/94)  BP(mean): --  ABP: --  ABP(mean): --  RR: 18 (24 Jan 2019 21:40) (17 - 26)  SpO2: 99% (24 Jan 2019 21:40) (96% - 100%)      I&O's Summary

## 2019-01-24 NOTE — ED PROVIDER NOTE - ATTENDING CONTRIBUTION TO CARE
69yof PMH of HTN, HLD, T2DM, CHF (combined systolic and diastolic with EF 30% in 2014). hypothyroidism, CVA 07, and 10/13 residual weakness and expressive aphasia, GERD, ischemic cardiomyopathy s/p AICD in 11/15, CAD, on plavix presents with SOB.  Last admission in Feb 2016 for CHF exacerbation. here for sob and whelan for few days now. Unremarkable exam, + edema, IV lasix, labs, EKG, cxr, CZs, Tele, Reassess CDU observation

## 2019-01-24 NOTE — ED PROVIDER NOTE - OBJECTIVE STATEMENT
69yof PMH of HTN, HLD, T2DM, CHF (combined systolic and diastolic with EF 30% in 2014). hypothyroidism, CVA 07, and 10/13 residual weakness and expressive aphasia, GERD, ischemic cardiomyopathy s/p AICD in 11/15, CAD, on plavix presents with SOB.  Last admission in Feb 2016 for CHF exacerbation. 69yof PMH of HTN, HLD, T2DM, CHF (combined systolic and diastolic with EF 30% in 2014). hypothyroidism, CVA 07, and 10/13 residual weakness and expressive aphasia, GERD, ischemic cardiomyopathy s/p AICD in 11/15, CAD, on plavix presents with SOB.  Last admission in Feb 2016 for CHF exacerbation. here for sob and whelan for few days now. pt seen and evaluated by PMD on 1/21 and advised to start pulmicort and follow up with [facility] for further eval. no fever or chills. reports "wheezing at night time" and mild cough with whitish sputum. No URI symx.

## 2019-01-24 NOTE — H&P ADULT - HISTORY OF PRESENT ILLNESS
69F c hx HTN, HLD, DM2, CAD s/p stents, combined BivCHF (EF 30% in 2014) s/p AICD, hypothyroidism, Afib on xarelto, CVA (2007, 2013) c/b residual mild left hemiparesis and moderate expressive aphasia, GERD, pw 2 days SOB, coughing, initially observed in CDU, now admitted with persistent SOB.    Pt reports being in usual state of health until 2 days ago, when she noticed nocturnal cough/hiccups, "wheezing", SOB on exertion, couldn't catch her breath, burning chest pain, intermittent palpitations, and increasing b/l LE swelling. Pt states she had similar symptoms back in Sept '18, when her lasix was increased with improvement. At baseline, reports 3 pillow orthopnea. +chills. Denies other URI symptoms, fevers. Pt thinks her SOB is a little better after the IV lasix. Other ROS as below.    VS: Tm 99.1, P 102, /70, R 26, 96% RA  In the ED, received lasix 40IV, kcl 40 po.  Tele showed intermittent NSVT.

## 2019-01-25 DIAGNOSIS — E11.59 TYPE 2 DIABETES MELLITUS WITH OTHER CIRCULATORY COMPLICATIONS: ICD-10-CM

## 2019-01-25 DIAGNOSIS — R09.89 OTHER SPECIFIED SYMPTOMS AND SIGNS INVOLVING THE CIRCULATORY AND RESPIRATORY SYSTEMS: ICD-10-CM

## 2019-01-25 DIAGNOSIS — I25.118 ATHEROSCLEROTIC HEART DISEASE OF NATIVE CORONARY ARTERY WITH OTHER FORMS OF ANGINA PECTORIS: ICD-10-CM

## 2019-01-25 DIAGNOSIS — I48.91 UNSPECIFIED ATRIAL FIBRILLATION: ICD-10-CM

## 2019-01-25 DIAGNOSIS — I50.41 ACUTE COMBINED SYSTOLIC (CONGESTIVE) AND DIASTOLIC (CONGESTIVE) HEART FAILURE: ICD-10-CM

## 2019-01-25 DIAGNOSIS — I63.9 CEREBRAL INFARCTION, UNSPECIFIED: ICD-10-CM

## 2019-01-25 DIAGNOSIS — R07.89 OTHER CHEST PAIN: ICD-10-CM

## 2019-01-25 LAB
ALBUMIN SERPL ELPH-MCNC: 4.1 G/DL — SIGNIFICANT CHANGE UP (ref 3.3–5)
ALP SERPL-CCNC: 74 U/L — SIGNIFICANT CHANGE UP (ref 40–120)
ALT FLD-CCNC: 9 U/L — LOW (ref 10–45)
ANION GAP SERPL CALC-SCNC: 11 MMOL/L — SIGNIFICANT CHANGE UP (ref 5–17)
ANION GAP SERPL CALC-SCNC: 16 MMOL/L — SIGNIFICANT CHANGE UP (ref 5–17)
APTT BLD: 43.5 SEC — HIGH (ref 27.5–36.3)
AST SERPL-CCNC: 19 U/L — SIGNIFICANT CHANGE UP (ref 10–40)
BASOPHILS # BLD AUTO: 0.02 K/UL — SIGNIFICANT CHANGE UP (ref 0–0.2)
BASOPHILS NFR BLD AUTO: 0.4 % — SIGNIFICANT CHANGE UP (ref 0–2)
BILIRUB SERPL-MCNC: 0.8 MG/DL — SIGNIFICANT CHANGE UP (ref 0.2–1.2)
BUN SERPL-MCNC: 23 MG/DL — SIGNIFICANT CHANGE UP (ref 7–23)
BUN SERPL-MCNC: 24 MG/DL — HIGH (ref 7–23)
CALCIUM SERPL-MCNC: 9.8 MG/DL — SIGNIFICANT CHANGE UP (ref 8.4–10.5)
CALCIUM SERPL-MCNC: 9.8 MG/DL — SIGNIFICANT CHANGE UP (ref 8.4–10.5)
CHLORIDE SERPL-SCNC: 104 MMOL/L — SIGNIFICANT CHANGE UP (ref 96–108)
CHLORIDE SERPL-SCNC: 105 MMOL/L — SIGNIFICANT CHANGE UP (ref 96–108)
CHOLEST SERPL-MCNC: 138 MG/DL — SIGNIFICANT CHANGE UP (ref 10–199)
CK MB CFR SERPL CALC: 2.4 NG/ML — SIGNIFICANT CHANGE UP (ref 0–3.8)
CK SERPL-CCNC: 49 U/L — SIGNIFICANT CHANGE UP (ref 25–170)
CO2 SERPL-SCNC: 25 MMOL/L — SIGNIFICANT CHANGE UP (ref 22–31)
CO2 SERPL-SCNC: 26 MMOL/L — SIGNIFICANT CHANGE UP (ref 22–31)
CREAT SERPL-MCNC: 0.82 MG/DL — SIGNIFICANT CHANGE UP (ref 0.5–1.3)
CREAT SERPL-MCNC: 0.99 MG/DL — SIGNIFICANT CHANGE UP (ref 0.5–1.3)
EOSINOPHIL # BLD AUTO: 0.08 K/UL — SIGNIFICANT CHANGE UP (ref 0–0.5)
EOSINOPHIL NFR BLD AUTO: 1.6 % — SIGNIFICANT CHANGE UP (ref 0–6)
GLUCOSE BLDC GLUCOMTR-MCNC: 125 MG/DL — HIGH (ref 70–99)
GLUCOSE BLDC GLUCOMTR-MCNC: 177 MG/DL — HIGH (ref 70–99)
GLUCOSE BLDC GLUCOMTR-MCNC: 211 MG/DL — HIGH (ref 70–99)
GLUCOSE BLDC GLUCOMTR-MCNC: 241 MG/DL — HIGH (ref 70–99)
GLUCOSE SERPL-MCNC: 157 MG/DL — HIGH (ref 70–99)
GLUCOSE SERPL-MCNC: 219 MG/DL — HIGH (ref 70–99)
HBA1C BLD-MCNC: 7.6 % — HIGH (ref 4–5.6)
HCT VFR BLD CALC: 42.2 % — SIGNIFICANT CHANGE UP (ref 34.5–45)
HCV AB S/CO SERPL IA: 0.07 S/CO — SIGNIFICANT CHANGE UP
HCV AB SERPL-IMP: SIGNIFICANT CHANGE UP
HDLC SERPL-MCNC: 53 MG/DL — SIGNIFICANT CHANGE UP
HGB BLD-MCNC: 13.6 G/DL — SIGNIFICANT CHANGE UP (ref 11.5–15.5)
IMM GRANULOCYTES NFR BLD AUTO: 0.2 % — SIGNIFICANT CHANGE UP (ref 0–1.5)
INR BLD: 2.53 RATIO — HIGH (ref 0.88–1.16)
LIPID PNL WITH DIRECT LDL SERPL: 74 MG/DL — SIGNIFICANT CHANGE UP
LYMPHOCYTES # BLD AUTO: 1.47 K/UL — SIGNIFICANT CHANGE UP (ref 1–3.3)
LYMPHOCYTES # BLD AUTO: 29.6 % — SIGNIFICANT CHANGE UP (ref 13–44)
MAGNESIUM SERPL-MCNC: 1.7 MG/DL — SIGNIFICANT CHANGE UP (ref 1.6–2.6)
MCHC RBC-ENTMCNC: 25 PG — LOW (ref 27–34)
MCHC RBC-ENTMCNC: 32.2 GM/DL — SIGNIFICANT CHANGE UP (ref 32–36)
MCV RBC AUTO: 77.6 FL — LOW (ref 80–100)
MONOCYTES # BLD AUTO: 0.39 K/UL — SIGNIFICANT CHANGE UP (ref 0–0.9)
MONOCYTES NFR BLD AUTO: 7.9 % — SIGNIFICANT CHANGE UP (ref 2–14)
NEUTROPHILS # BLD AUTO: 2.99 K/UL — SIGNIFICANT CHANGE UP (ref 1.8–7.4)
NEUTROPHILS NFR BLD AUTO: 60.3 % — SIGNIFICANT CHANGE UP (ref 43–77)
PHOSPHATE SERPL-MCNC: 3.6 MG/DL — SIGNIFICANT CHANGE UP (ref 2.5–4.5)
PLATELET # BLD AUTO: 105 K/UL — LOW (ref 150–400)
POTASSIUM SERPL-MCNC: 3 MMOL/L — LOW (ref 3.5–5.3)
POTASSIUM SERPL-MCNC: 4.3 MMOL/L — SIGNIFICANT CHANGE UP (ref 3.5–5.3)
POTASSIUM SERPL-SCNC: 3 MMOL/L — LOW (ref 3.5–5.3)
POTASSIUM SERPL-SCNC: 4.3 MMOL/L — SIGNIFICANT CHANGE UP (ref 3.5–5.3)
PROT SERPL-MCNC: 7.6 G/DL — SIGNIFICANT CHANGE UP (ref 6–8.3)
PROTHROM AB SERPL-ACNC: 29.2 SEC — HIGH (ref 10–13.1)
RBC # BLD: 5.44 M/UL — HIGH (ref 3.8–5.2)
RBC # FLD: 15 % — HIGH (ref 10.3–14.5)
SODIUM SERPL-SCNC: 142 MMOL/L — SIGNIFICANT CHANGE UP (ref 135–145)
SODIUM SERPL-SCNC: 145 MMOL/L — SIGNIFICANT CHANGE UP (ref 135–145)
TOTAL CHOLESTEROL/HDL RATIO MEASUREMENT: 2.6 RATIO — LOW (ref 3.3–7.1)
TRIGL SERPL-MCNC: 54 MG/DL — SIGNIFICANT CHANGE UP (ref 10–149)
TROPONIN T, HIGH SENSITIVITY RESULT: 21 NG/L — SIGNIFICANT CHANGE UP (ref 0–51)
TSH SERPL-MCNC: 0.57 UIU/ML — SIGNIFICANT CHANGE UP (ref 0.27–4.2)
WBC # BLD: 4.96 K/UL — SIGNIFICANT CHANGE UP (ref 3.8–10.5)
WBC # FLD AUTO: 4.96 K/UL — SIGNIFICANT CHANGE UP (ref 3.8–10.5)

## 2019-01-25 RX ORDER — MAGNESIUM SULFATE 500 MG/ML
2 VIAL (ML) INJECTION ONCE
Qty: 0 | Refills: 0 | Status: COMPLETED | OUTPATIENT
Start: 2019-01-25 | End: 2019-01-25

## 2019-01-25 RX ORDER — FAMOTIDINE 10 MG/ML
20 INJECTION INTRAVENOUS
Qty: 0 | Refills: 0 | Status: DISCONTINUED | OUTPATIENT
Start: 2019-01-25 | End: 2019-01-31

## 2019-01-25 RX ORDER — POTASSIUM CHLORIDE 20 MEQ
40 PACKET (EA) ORAL ONCE
Qty: 0 | Refills: 0 | Status: COMPLETED | OUTPATIENT
Start: 2019-01-25 | End: 2019-01-25

## 2019-01-25 RX ORDER — FUROSEMIDE 40 MG
40 TABLET ORAL DAILY
Qty: 0 | Refills: 0 | Status: DISCONTINUED | OUTPATIENT
Start: 2019-01-25 | End: 2019-01-31

## 2019-01-25 RX ORDER — INSULIN LISPRO 100/ML
VIAL (ML) SUBCUTANEOUS AT BEDTIME
Qty: 0 | Refills: 0 | Status: DISCONTINUED | OUTPATIENT
Start: 2019-01-25 | End: 2019-01-31

## 2019-01-25 RX ORDER — POTASSIUM CHLORIDE 20 MEQ
10 PACKET (EA) ORAL
Qty: 0 | Refills: 0 | Status: DISCONTINUED | OUTPATIENT
Start: 2019-01-25 | End: 2019-01-25

## 2019-01-25 RX ORDER — POTASSIUM CHLORIDE 20 MEQ
40 PACKET (EA) ORAL EVERY 4 HOURS
Qty: 0 | Refills: 0 | Status: DISCONTINUED | OUTPATIENT
Start: 2019-01-25 | End: 2019-01-25

## 2019-01-25 RX ADMIN — Medication 2: at 12:21

## 2019-01-25 RX ADMIN — Medication 81 MILLIGRAM(S): at 12:21

## 2019-01-25 RX ADMIN — AMLODIPINE BESYLATE 5 MILLIGRAM(S): 2.5 TABLET ORAL at 05:48

## 2019-01-25 RX ADMIN — BUDESONIDE AND FORMOTEROL FUMARATE DIHYDRATE 2 PUFF(S): 160; 4.5 AEROSOL RESPIRATORY (INHALATION) at 05:51

## 2019-01-25 RX ADMIN — CARVEDILOL PHOSPHATE 25 MILLIGRAM(S): 80 CAPSULE, EXTENDED RELEASE ORAL at 05:48

## 2019-01-25 RX ADMIN — FAMOTIDINE 20 MILLIGRAM(S): 10 INJECTION INTRAVENOUS at 17:52

## 2019-01-25 RX ADMIN — Medication 40 MILLIEQUIVALENT(S): at 12:22

## 2019-01-25 RX ADMIN — PANTOPRAZOLE SODIUM 40 MILLIGRAM(S): 20 TABLET, DELAYED RELEASE ORAL at 05:47

## 2019-01-25 RX ADMIN — SACUBITRIL AND VALSARTAN 1 TABLET(S): 24; 26 TABLET, FILM COATED ORAL at 17:52

## 2019-01-25 RX ADMIN — FAMOTIDINE 20 MILLIGRAM(S): 10 INJECTION INTRAVENOUS at 05:48

## 2019-01-25 RX ADMIN — Medication 50 GRAM(S): at 10:53

## 2019-01-25 RX ADMIN — Medication 2: at 16:50

## 2019-01-25 RX ADMIN — BUDESONIDE AND FORMOTEROL FUMARATE DIHYDRATE 2 PUFF(S): 160; 4.5 AEROSOL RESPIRATORY (INHALATION) at 17:52

## 2019-01-25 RX ADMIN — Medication 40 MILLIGRAM(S): at 03:08

## 2019-01-25 RX ADMIN — Medication 40 MILLIEQUIVALENT(S): at 05:52

## 2019-01-25 RX ADMIN — Medication 112 MICROGRAM(S): at 05:47

## 2019-01-25 RX ADMIN — RIVAROXABAN 20 MILLIGRAM(S): KIT at 17:52

## 2019-01-25 RX ADMIN — CARVEDILOL PHOSPHATE 25 MILLIGRAM(S): 80 CAPSULE, EXTENDED RELEASE ORAL at 17:52

## 2019-01-25 RX ADMIN — SACUBITRIL AND VALSARTAN 1 TABLET(S): 24; 26 TABLET, FILM COATED ORAL at 05:52

## 2019-01-25 NOTE — PROGRESS NOTE ADULT - SUBJECTIVE AND OBJECTIVE BOX
Patient is a 69y old  Female who presents with a chief complaint of SOB, coughing (24 Jan 2019 23:45)      SUBJECTIVE / OVERNIGHT EVENTS:    Believes breathing is mildly improved compared to yesterday.  She still feels intermittent midsternal burning sensation, which she associates with her past MIs.    Vital Signs Last 24 Hrs  T(C): 36.3 (25 Jan 2019 11:36), Max: 37.3 (24 Jan 2019 17:29)  T(F): 97.3 (25 Jan 2019 11:36), Max: 99.1 (24 Jan 2019 17:29)  HR: 72 (25 Jan 2019 11:36) (61 - 89)  BP: 105/68 (25 Jan 2019 11:36) (105/68 - 156/92)  BP(mean): --  RR: 18 (25 Jan 2019 11:36) (17 - 18)  SpO2: 98% (25 Jan 2019 11:36) (96% - 100%)  I&O's Summary    24 Jan 2019 07:01  -  25 Jan 2019 07:00  --------------------------------------------------------  IN: 0 mL / OUT: 300 mL / NET: -300 mL    25 Jan 2019 07:01  -  25 Jan 2019 12:24  --------------------------------------------------------  IN: 354 mL / OUT: 0 mL / NET: 354 mL        GENERAL: NAD, AAOx3  HEAD:  Atraumatic, Normocephalic  EYES: EOMI, PERRLA, conjunctiva and sclera clear  NECK: Supple, No JVD, No LAD  CHEST/LUNG: Bibasilar rales  HEART: Irregular rate and rhythm; No murmurs, rubs, or gallops  ABDOMEN: Soft, Nontender, Nondistended; Bowel sounds present  EXTREMITIES:  2+ Peripheral Pulses, No clubbing, cyanosis, or edema  SKIN: No rashes or lesions  NEURO: dysphasia from prior CVA    LABS:                        13.6   4.96  )-----------( 105      ( 25 Jan 2019 07:40 )             42.2     01-25    145  |  104  |  23  ----------------------------<  157<H>  3.0<L>   |  25  |  0.82    Ca    9.8      25 Jan 2019 06:34  Phos  3.6     01-25  Mg     1.7     01-25    TPro  7.6  /  Alb  4.1  /  TBili  0.8  /  DBili  x   /  AST  19  /  ALT  9<L>  /  AlkPhos  74  01-25    PT/INR - ( 25 Jan 2019 07:37 )   PT: 29.2 sec;   INR: 2.53 ratio         PTT - ( 25 Jan 2019 07:37 )  PTT:43.5 sec  CAPILLARY BLOOD GLUCOSE      POCT Blood Glucose.: 211 mg/dL (25 Jan 2019 11:57)  POCT Blood Glucose.: 125 mg/dL (25 Jan 2019 08:06)  POCT Blood Glucose.: 160 mg/dL (24 Jan 2019 18:31)    CARDIAC MARKERS ( 25 Jan 2019 06:34 )  x     / x     / 49 U/L / x     / 2.4 ng/mL          RADIOLOGY & ADDITIONAL TESTS:    Imaging Personally Reviewed:  [x] YES  [ ] NO    Consultant(s) Notes Reviewed:  [x] YES  [ ] NO      MEDICATIONS  (STANDING):  amLODIPine   Tablet 5 milliGRAM(s) Oral daily  aspirin enteric coated 81 milliGRAM(s) Oral daily  buDESOnide  80 MICROgram(s)/formoterol 4.5 MICROgram(s) Inhaler 2 Puff(s) Inhalation two times a day  carvedilol 25 milliGRAM(s) Oral every 12 hours  dextrose 5%. 1000 milliLiter(s) (50 mL/Hr) IV Continuous <Continuous>  dextrose 50% Injectable 12.5 Gram(s) IV Push once  dextrose 50% Injectable 25 Gram(s) IV Push once  dextrose 50% Injectable 25 Gram(s) IV Push once  famotidine    Tablet 20 milliGRAM(s) Oral two times a day  furosemide   Injectable 40 milliGRAM(s) IV Push daily  insulin lispro (HumaLOG) corrective regimen sliding scale   SubCutaneous three times a day before meals  levothyroxine 112 MICROGram(s) Oral daily  pantoprazole    Tablet 40 milliGRAM(s) Oral before breakfast  rivaroxaban 20 milliGRAM(s) Oral every 24 hours  sacubitril 49 mG/valsartan 51 mG 1 Tablet(s) Oral two times a day    MEDICATIONS  (PRN):  dextrose 40% Gel 15 Gram(s) Oral once PRN Blood Glucose LESS THAN 70 milliGRAM(s)/deciliter  glucagon  Injectable 1 milliGRAM(s) IntraMuscular once PRN Glucose LESS THAN 70 milligrams/deciliter      Care Discussed with Consultants/Other Providers [x] YES  [ ] NO    HEALTH ISSUES - PROBLEM Dx:  Suspected deep vein thrombosis  Type 2 diabetes mellitus with other circulatory complication, without long-term current use of insulin: Type 2 diabetes mellitus with other circulatory complication, without long-term current use of insulin  Cerebrovascular accident (CVA), unspecified mechanism: Cerebrovascular accident (CVA), unspecified mechanism  Atrial fibrillation, unspecified type: Atrial fibrillation, unspecified type  Coronary artery disease of native artery of native heart with stable angina pectoris: Coronary artery disease of native artery of native heart with stable angina pectoris  Chest pain, atypical: Chest pain, atypical  Acute combined systolic and diastolic congestive heart failure: Acute combined systolic and diastolic congestive heart failure

## 2019-01-25 NOTE — PROGRESS NOTE ADULT - PROBLEM SELECTOR PLAN 1
- CXR showing pulm edema  - cont lasix 40mg IV qday  - tele  - TTE showing ?comparable/grossly unchanged EF 26%, bivHF, mod MR  - cont coreg   - appreciate cardiology

## 2019-01-25 NOTE — CONSULT NOTE ADULT - SUBJECTIVE AND OBJECTIVE BOX
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    Pt reports being in usual state of health until 2 days ago, when she noticed nocturnal cough/hiccups, "wheezing", SOB on exertion, couldn't catch her breath, burning chest pain, intermittent palpitations, and increasing b/l LE swelling. Pt states she had similar symptoms back in Sept '18, when her lasix was increased with improvement. At baseline, reports 3 pillow orthopnea. +chills. Denies other URI symptoms, fevers. Pt thinks her SOB is a little better after the IV lasix. Other ROS as below.    VS: Tm 99.1, P 102, /70, R 26, 96% RA  In the ED, received lasix 40IV, kcl 40 po.  Tele showed intermittent NSVT. (24 Jan 2019 23:45)      PAST MEDICAL & SURGICAL HISTORY:  NSTEMI (non-ST elevation myocardial infarction)  DJD (degenerative joint disease) of lumbar spine  Dysthymia  Cardiomegaly  Hyperuricemia  Thalassemia  PVD (peripheral vascular disease)  Asthma  GERD (gastroesophageal reflux disease)  Vitamin D deficiency  Hyperlipidemia  MI (myocardial infarction): 1993, 2011  Chronic CHF  Adult Hypothyroidism  CAD (Coronary Artery Disease)  DM (Diabetes Mellitus Screen)  HTN (Hypertension)  CVA (Cerebral Infarction): CVA x 2  2007 and 10/2013  Benign neoplasm of colon: 2010  Stented coronary artery: 2011  H/O: Hysterectomy          MEDICATIONS:  MEDICATIONS  (STANDING):  amLODIPine   Tablet 5 milliGRAM(s) Oral daily  aspirin enteric coated 81 milliGRAM(s) Oral daily  buDESOnide  80 MICROgram(s)/formoterol 4.5 MICROgram(s) Inhaler 2 Puff(s) Inhalation two times a day  carvedilol 25 milliGRAM(s) Oral every 12 hours  dextrose 5%. 1000 milliLiter(s) (50 mL/Hr) IV Continuous <Continuous>  dextrose 50% Injectable 12.5 Gram(s) IV Push once  dextrose 50% Injectable 25 Gram(s) IV Push once  dextrose 50% Injectable 25 Gram(s) IV Push once  famotidine    Tablet 20 milliGRAM(s) Oral two times a day  furosemide   Injectable 40 milliGRAM(s) IV Push daily  insulin lispro (HumaLOG) corrective regimen sliding scale   SubCutaneous three times a day before meals  levothyroxine 112 MICROGram(s) Oral daily  pantoprazole    Tablet 40 milliGRAM(s) Oral before breakfast  rivaroxaban 20 milliGRAM(s) Oral every 24 hours  sacubitril 49 mG/valsartan 51 mG 1 Tablet(s) Oral two times a day      Allergies    penicillins (Unknown)    Intolerances    ACE inhibitors (Unknown)  Lipitor (Muscle Pain)  metformin (Diarrhea)      FAMILY HISTORY:  No pertinent family history in first degree relatives    Non-contributary for premature coronary disease or sudden cardiac death    SOCIAL HISTORY:    [ x] Non-smoker  [ ] Smoker  [ ] Alcohol      REVIEW OF SYSTEMS:  [ ]chest pain  [ x ]shortness of breath  [  ]palpitations  [  ]syncope  [ ]near syncope [ ]upper extremity weakness   [ ] lower extremity weakness  [  ]diplopia  [  ]altered mental status   [  ]fevers  [ ]chills [ ]nausea  [ ]vomitting  [  ]dysphagia    [ ]abdominal pain  [ ]melena  [ ]BRBPR    [  ]epistaxis  [  ]rash    [ ]lower extremity edema        [x ] All others negative	  [ ] Unable to obtain      LABS:	 	    CARDIAC MARKERS:  CARDIAC MARKERS ( 25 Jan 2019 06:34 )  x     / x     / 49 U/L / x     / 2.4 ng/mL                              13.6   4.96  )-----------( 105      ( 25 Jan 2019 07:40 )             42.2     Hb Trend: 13.6<--    01-25    145  |  104  |  23  ----------------------------<  157<H>  3.0<L>   |  25  |  0.82    Ca    9.8      25 Jan 2019 06:34  Phos  3.6     01-25  Mg     1.7     01-25    TPro  7.6  /  Alb  4.1  /  TBili  0.8  /  DBili  x   /  AST  19  /  ALT  9<L>  /  AlkPhos  74  01-25    Creatinine Trend: 0.82<--, 0.83<--    Coags:  PT/INR - ( 25 Jan 2019 07:37 )   PT: 29.2 sec;   INR: 2.53 ratio         PTT - ( 25 Jan 2019 07:37 )  PTT:43.5 sec    HgA1c: Hemoglobin A1C, Whole Blood: 7.6 % (01-25 @ 07:40)      PHYSICAL EXAM:  T(C): 36.3 (01-25-19 @ 11:36), Max: 37.3 (01-24-19 @ 17:29)  HR: 72 (01-25-19 @ 11:36) (61 - 85)  BP: 105/68 (01-25-19 @ 11:36) (105/68 - 156/92)  RR: 18 (01-25-19 @ 11:36) (17 - 18)  SpO2: 98% (01-25-19 @ 11:36) (96% - 100%)  Wt(kg): --  I&O's Summary    24 Jan 2019 07:01  -  25 Jan 2019 07:00  --------------------------------------------------------  IN: 0 mL / OUT: 300 mL / NET: -300 mL    25 Jan 2019 07:01  -  25 Jan 2019 14:50  --------------------------------------------------------  IN: 708 mL / OUT: 0 mL / NET: 708 mL        Gen: Appears well in NAD  HEENT:  (-)icterus (-)pallor  CV: N S1 S2 1/6 ERICK (+)2 Pulses B/l  Resp:  Clear to ausculatation B/L, normal effort  GI: (+) BS Soft, NT, ND  Lymph:  (-)Edema, (-)obvious lymphadenopathy  Skin: Warm to touch, Normal turgor  Psych: Appropriate mood and affect        TELEMETRY: 	 Sinus     ECG:  	sinus 85 BPM, LVH, IVCD    RADIOLOGY:         CXR:  Mild pulmonary edema      ASSESSMENT/PLAN: 	69y Female HTN, HLD, DM2, CAD s/p stents, ICM combined BivCHF (EF 30% in 2014) s/p single lead ICD, hypothyroidism, Afib on xarelto, CVA (2007, 2013) c/b residual mild left hemiparesis and moderate expressive aphasia, GERD, pw 2 days SOB.    - Signs and symptoms consistent with acute decompensated systolic heart failure  - IV lasix to keep net negative  - Echo  - Appears to be well perfused cont BB  - Interrogate ICD  - further rec pending above    I once again thank you for allowing me to participate in the care of your patient.  If you have any questions or concerns please do not hesitate to contact me.    Bob Lino MD, [facility]  BEEPER [phone]

## 2019-01-26 LAB
ANION GAP SERPL CALC-SCNC: 11 MMOL/L — SIGNIFICANT CHANGE UP (ref 5–17)
BUN SERPL-MCNC: 31 MG/DL — HIGH (ref 7–23)
CALCIUM SERPL-MCNC: 9.1 MG/DL — SIGNIFICANT CHANGE UP (ref 8.4–10.5)
CHLORIDE SERPL-SCNC: 108 MMOL/L — SIGNIFICANT CHANGE UP (ref 96–108)
CO2 SERPL-SCNC: 23 MMOL/L — SIGNIFICANT CHANGE UP (ref 22–31)
CREAT SERPL-MCNC: 0.97 MG/DL — SIGNIFICANT CHANGE UP (ref 0.5–1.3)
GLUCOSE BLDC GLUCOMTR-MCNC: 101 MG/DL — HIGH (ref 70–99)
GLUCOSE BLDC GLUCOMTR-MCNC: 164 MG/DL — HIGH (ref 70–99)
GLUCOSE BLDC GLUCOMTR-MCNC: 204 MG/DL — HIGH (ref 70–99)
GLUCOSE BLDC GLUCOMTR-MCNC: 271 MG/DL — HIGH (ref 70–99)
GLUCOSE SERPL-MCNC: 207 MG/DL — HIGH (ref 70–99)
HCT VFR BLD CALC: 40.3 % — SIGNIFICANT CHANGE UP (ref 34.5–45)
HGB BLD-MCNC: 13 G/DL — SIGNIFICANT CHANGE UP (ref 11.5–15.5)
MAGNESIUM SERPL-MCNC: 2 MG/DL — SIGNIFICANT CHANGE UP (ref 1.6–2.6)
MCHC RBC-ENTMCNC: 25.5 PG — LOW (ref 27–34)
MCHC RBC-ENTMCNC: 32.3 GM/DL — SIGNIFICANT CHANGE UP (ref 32–36)
MCV RBC AUTO: 79.2 FL — LOW (ref 80–100)
PLATELET # BLD AUTO: 109 K/UL — LOW (ref 150–400)
POTASSIUM SERPL-MCNC: 3.7 MMOL/L — SIGNIFICANT CHANGE UP (ref 3.5–5.3)
POTASSIUM SERPL-SCNC: 3.7 MMOL/L — SIGNIFICANT CHANGE UP (ref 3.5–5.3)
RBC # BLD: 5.09 M/UL — SIGNIFICANT CHANGE UP (ref 3.8–5.2)
RBC # FLD: 15 % — HIGH (ref 10.3–14.5)
SODIUM SERPL-SCNC: 142 MMOL/L — SIGNIFICANT CHANGE UP (ref 135–145)
WBC # BLD: 4.27 K/UL — SIGNIFICANT CHANGE UP (ref 3.8–10.5)
WBC # FLD AUTO: 4.27 K/UL — SIGNIFICANT CHANGE UP (ref 3.8–10.5)

## 2019-01-26 RX ADMIN — FAMOTIDINE 20 MILLIGRAM(S): 10 INJECTION INTRAVENOUS at 17:08

## 2019-01-26 RX ADMIN — AMLODIPINE BESYLATE 5 MILLIGRAM(S): 2.5 TABLET ORAL at 05:11

## 2019-01-26 RX ADMIN — Medication 3: at 17:09

## 2019-01-26 RX ADMIN — Medication 2: at 08:16

## 2019-01-26 RX ADMIN — BUDESONIDE AND FORMOTEROL FUMARATE DIHYDRATE 2 PUFF(S): 160; 4.5 AEROSOL RESPIRATORY (INHALATION) at 05:12

## 2019-01-26 RX ADMIN — FAMOTIDINE 20 MILLIGRAM(S): 10 INJECTION INTRAVENOUS at 05:11

## 2019-01-26 RX ADMIN — CARVEDILOL PHOSPHATE 25 MILLIGRAM(S): 80 CAPSULE, EXTENDED RELEASE ORAL at 17:08

## 2019-01-26 RX ADMIN — Medication 81 MILLIGRAM(S): at 11:39

## 2019-01-26 RX ADMIN — RIVAROXABAN 20 MILLIGRAM(S): KIT at 17:08

## 2019-01-26 RX ADMIN — Medication 40 MILLIGRAM(S): at 05:11

## 2019-01-26 RX ADMIN — SACUBITRIL AND VALSARTAN 1 TABLET(S): 24; 26 TABLET, FILM COATED ORAL at 17:08

## 2019-01-26 RX ADMIN — PANTOPRAZOLE SODIUM 40 MILLIGRAM(S): 20 TABLET, DELAYED RELEASE ORAL at 05:11

## 2019-01-26 RX ADMIN — SACUBITRIL AND VALSARTAN 1 TABLET(S): 24; 26 TABLET, FILM COATED ORAL at 05:11

## 2019-01-26 RX ADMIN — CARVEDILOL PHOSPHATE 25 MILLIGRAM(S): 80 CAPSULE, EXTENDED RELEASE ORAL at 05:11

## 2019-01-26 RX ADMIN — Medication 112 MICROGRAM(S): at 05:11

## 2019-01-26 RX ADMIN — Medication 1: at 12:23

## 2019-01-26 RX ADMIN — BUDESONIDE AND FORMOTEROL FUMARATE DIHYDRATE 2 PUFF(S): 160; 4.5 AEROSOL RESPIRATORY (INHALATION) at 17:08

## 2019-01-26 NOTE — PROGRESS NOTE ADULT - SUBJECTIVE AND OBJECTIVE BOX
cramping in her rt knee after everytime she gets a lasix dosing  breathing steadily improving  denies acute CP this morning    Vital Signs Last 24 Hrs  T(C): 36.6 (26 Jan 2019 04:06), Max: 36.6 (26 Jan 2019 04:06)  T(F): 97.8 (26 Jan 2019 04:06), Max: 97.8 (26 Jan 2019 04:06)  HR: 75 (26 Jan 2019 04:06) (71 - 81)  BP: 134/78 (26 Jan 2019 04:06) (105/68 - 142/88)  BP(mean): --  RR: 18 (26 Jan 2019 04:06) (18 - 18)  SpO2: 100% (26 Jan 2019 04:06) (98% - 100%)    GENERAL: NAD, AAOx3  HEAD:  Atraumatic, Normocephalic  EYES: EOMI, PERRLA, conjunctiva and sclera clear  NECK: Supple, No JVD, No LAD  CHEST/LUNG: improving bibasilar rales  HEART: Irregular rate and rhythm; No murmurs, rubs, or gallops  ABDOMEN: Soft, Nontender, Nondistended; Bowel sounds present  EXTREMITIES:  2+ Peripheral Pulses, No clubbing, cyanosis, or edema  SKIN: No rashes or lesions  NEURO: dysphasia from prior CVA    LABS:                        13.0   4.27  )-----------( x        ( 26 Jan 2019 09:08 )             40.3     01-26    142  |  108  |  31<H>  ----------------------------<  207<H>  3.7   |  23  |  0.97    Ca    9.1      26 Jan 2019 06:24  Phos  3.6     01-25  Mg     2.0     01-26    TPro  7.6  /  Alb  4.1  /  TBili  0.8  /  DBili  x   /  AST  19  /  ALT  9<L>  /  AlkPhos  74  01-25    PT/INR - ( 25 Jan 2019 07:37 )   PT: 29.2 sec;   INR: 2.53 ratio         PTT - ( 25 Jan 2019 07:37 )  PTT:43.5 sec  CAPILLARY BLOOD GLUCOSE      POCT Blood Glucose.: 204 mg/dL (26 Jan 2019 08:09)  POCT Blood Glucose.: 177 mg/dL (25 Jan 2019 21:34)  POCT Blood Glucose.: 241 mg/dL (25 Jan 2019 16:34)  POCT Blood Glucose.: 211 mg/dL (25 Jan 2019 11:57)    CARDIAC MARKERS ( 25 Jan 2019 06:34 )  x     / x     / 49 U/L / x     / 2.4 ng/mL

## 2019-01-26 NOTE — PHYSICAL THERAPY INITIAL EVALUATION ADULT - PERTINENT HX OF CURRENT PROBLEM, REHAB EVAL
Pt is a 69 F with a PMH of HTN, DM2, CAD s/p stents, BivCHF s/p AICD, hypothyroidism, Afib, CVA(2007, 2013) with mild left hemiparesis and expressive aphasia, GERD that presented with SOB, coughing, chest pain, palpations, and b/l Le swelling.

## 2019-01-26 NOTE — PROGRESS NOTE ADULT - SUBJECTIVE AND OBJECTIVE BOX
Subjective: pt seen and examined, no complaints, ROS - .     no events overnight     amLODIPine   Tablet 5 milliGRAM(s) Oral daily  aspirin enteric coated 81 milliGRAM(s) Oral daily  buDESOnide  80 MICROgram(s)/formoterol 4.5 MICROgram(s) Inhaler 2 Puff(s) Inhalation two times a day  carvedilol 25 milliGRAM(s) Oral every 12 hours  dextrose 40% Gel 15 Gram(s) Oral once PRN  dextrose 5%. 1000 milliLiter(s) IV Continuous <Continuous>  dextrose 50% Injectable 12.5 Gram(s) IV Push once  dextrose 50% Injectable 25 Gram(s) IV Push once  dextrose 50% Injectable 25 Gram(s) IV Push once  famotidine    Tablet 20 milliGRAM(s) Oral two times a day  furosemide   Injectable 40 milliGRAM(s) IV Push daily  glucagon  Injectable 1 milliGRAM(s) IntraMuscular once PRN  insulin lispro (HumaLOG) corrective regimen sliding scale   SubCutaneous three times a day before meals  insulin lispro (HumaLOG) corrective regimen sliding scale   SubCutaneous at bedtime  levothyroxine 112 MICROGram(s) Oral daily  pantoprazole    Tablet 40 milliGRAM(s) Oral before breakfast  rivaroxaban 20 milliGRAM(s) Oral every 24 hours  sacubitril 49 mG/valsartan 51 mG 1 Tablet(s) Oral two times a day                            13.6   4.96  )-----------( 105      ( 25 Jan 2019 07:40 )             42.2       Hemoglobin: 13.6 g/dL (01-25 @ 07:40)  Hemoglobin: 14.2 g/dL (01-24 @ 10:24)      01-25    142  |  105  |  24<H>  ----------------------------<  219<H>  4.3   |  26  |  0.99    Ca    9.8      25 Jan 2019 15:12  Phos  3.6     01-25  Mg     1.7     01-25    TPro  7.6  /  Alb  4.1  /  TBili  0.8  /  DBili  x   /  AST  19  /  ALT  9<L>  /  AlkPhos  74  01-25    Creatinine Trend: 0.99<--, 0.82<--, 0.83<--    COAGS:     CARDIAC MARKERS ( 25 Jan 2019 06:34 )  x     / x     / 49 U/L / x     / 2.4 ng/mL        T(C): 36.6 (01-26-19 @ 04:06), Max: 36.6 (01-26-19 @ 04:06)  HR: 75 (01-26-19 @ 04:06) (61 - 81)  BP: 134/78 (01-26-19 @ 04:06) (105/68 - 148/82)  RR: 18 (01-26-19 @ 04:06) (18 - 18)  SpO2: 100% (01-26-19 @ 04:06) (98% - 100%)  Wt(kg): --    I&O's Summary    24 Jan 2019 07:01  -  25 Jan 2019 07:00  --------------------------------------------------------  IN: 0 mL / OUT: 300 mL / NET: -300 mL    25 Jan 2019 07:01  -  26 Jan 2019 05:30  --------------------------------------------------------  IN: 948 mL / OUT: 0 mL / NET: 948 mL      Gen: Appears well in NAD  HEENT:  (-)icterus (-)pallor  CV: N S1 S2 1/6 ERICK (+)2 Pulses B/l  Resp:  Clear to ausculatation B/L, normal effort  GI: (+) BS Soft, NT, ND  Lymph:  (-)Edema, (-)obvious lymphadenopathy  Skin: Warm to touch, Normal turgor  Psych: Appropriate mood and affect        TELEMETRY: 	 Sinus     ECG:  	sinus 85 BPM, LVH, IVCD    RADIOLOGY:         CXR:  Mild pulmonary edema      ASSESSMENT/PLAN: 	69y Female HTN, HLD, DM2, CAD s/p stents, ICM combined BivCHF (EF 30% in 2014) s/p single lead ICD, hypothyroidism, Afib on xarelto, CVA (2007, 2013) c/b residual mild left hemiparesis and moderate expressive aphasia, GERD, pw 2 days SOB.    cont aggressive IV diuresis , monitor Creatine    GI / DVT prophylaxis.   keep K>4, mag >2.0   Echo pending   AICD interrogation pending   COnt rate control with BB at present   A/C with xarelto for Afib   D/W Dr Lozada

## 2019-01-26 NOTE — PHYSICAL THERAPY INITIAL EVALUATION ADULT - ADDITIONAL COMMENTS
Pt A&Ox4. Pt stated that she lives with  and daughter in 2 story home. Bed and bathroom on 1st floor. Pt is independent with all functional activities and does not use an assistive device for ambulation.

## 2019-01-27 LAB
ANION GAP SERPL CALC-SCNC: 13 MMOL/L — SIGNIFICANT CHANGE UP (ref 5–17)
BUN SERPL-MCNC: 29 MG/DL — HIGH (ref 7–23)
CALCIUM SERPL-MCNC: 9.2 MG/DL — SIGNIFICANT CHANGE UP (ref 8.4–10.5)
CHLORIDE SERPL-SCNC: 105 MMOL/L — SIGNIFICANT CHANGE UP (ref 96–108)
CO2 SERPL-SCNC: 24 MMOL/L — SIGNIFICANT CHANGE UP (ref 22–31)
CREAT SERPL-MCNC: 0.95 MG/DL — SIGNIFICANT CHANGE UP (ref 0.5–1.3)
GLUCOSE BLDC GLUCOMTR-MCNC: 144 MG/DL — HIGH (ref 70–99)
GLUCOSE BLDC GLUCOMTR-MCNC: 209 MG/DL — HIGH (ref 70–99)
GLUCOSE BLDC GLUCOMTR-MCNC: 295 MG/DL — HIGH (ref 70–99)
GLUCOSE BLDC GLUCOMTR-MCNC: 99 MG/DL — SIGNIFICANT CHANGE UP (ref 70–99)
GLUCOSE SERPL-MCNC: 107 MG/DL — HIGH (ref 70–99)
HCT VFR BLD CALC: 41.2 % — SIGNIFICANT CHANGE UP (ref 34.5–45)
HGB BLD-MCNC: 13.1 G/DL — SIGNIFICANT CHANGE UP (ref 11.5–15.5)
MAGNESIUM SERPL-MCNC: 2 MG/DL — SIGNIFICANT CHANGE UP (ref 1.6–2.6)
MCHC RBC-ENTMCNC: 25 PG — LOW (ref 27–34)
MCHC RBC-ENTMCNC: 31.8 GM/DL — LOW (ref 32–36)
MCV RBC AUTO: 78.8 FL — LOW (ref 80–100)
PHOSPHATE SERPL-MCNC: 3.5 MG/DL — SIGNIFICANT CHANGE UP (ref 2.5–4.5)
PLATELET # BLD AUTO: 126 K/UL — LOW (ref 150–400)
POTASSIUM SERPL-MCNC: 3.3 MMOL/L — LOW (ref 3.5–5.3)
POTASSIUM SERPL-SCNC: 3.3 MMOL/L — LOW (ref 3.5–5.3)
RBC # BLD: 5.23 M/UL — HIGH (ref 3.8–5.2)
RBC # FLD: 14.8 % — HIGH (ref 10.3–14.5)
SODIUM SERPL-SCNC: 142 MMOL/L — SIGNIFICANT CHANGE UP (ref 135–145)
WBC # BLD: 4.45 K/UL — SIGNIFICANT CHANGE UP (ref 3.8–10.5)
WBC # FLD AUTO: 4.45 K/UL — SIGNIFICANT CHANGE UP (ref 3.8–10.5)

## 2019-01-27 PROCEDURE — 93010 ELECTROCARDIOGRAM REPORT: CPT

## 2019-01-27 RX ORDER — MAGNESIUM SULFATE 500 MG/ML
2 VIAL (ML) INJECTION ONCE
Qty: 0 | Refills: 0 | Status: COMPLETED | OUTPATIENT
Start: 2019-01-27 | End: 2019-01-27

## 2019-01-27 RX ORDER — POTASSIUM CHLORIDE 20 MEQ
20 PACKET (EA) ORAL
Qty: 0 | Refills: 0 | Status: COMPLETED | OUTPATIENT
Start: 2019-01-27 | End: 2019-01-27

## 2019-01-27 RX ADMIN — CARVEDILOL PHOSPHATE 25 MILLIGRAM(S): 80 CAPSULE, EXTENDED RELEASE ORAL at 06:34

## 2019-01-27 RX ADMIN — Medication 20 MILLIEQUIVALENT(S): at 14:38

## 2019-01-27 RX ADMIN — BUDESONIDE AND FORMOTEROL FUMARATE DIHYDRATE 2 PUFF(S): 160; 4.5 AEROSOL RESPIRATORY (INHALATION) at 18:28

## 2019-01-27 RX ADMIN — Medication 81 MILLIGRAM(S): at 12:40

## 2019-01-27 RX ADMIN — AMLODIPINE BESYLATE 5 MILLIGRAM(S): 2.5 TABLET ORAL at 06:35

## 2019-01-27 RX ADMIN — Medication 112 MICROGRAM(S): at 06:34

## 2019-01-27 RX ADMIN — Medication 20 MILLIEQUIVALENT(S): at 11:00

## 2019-01-27 RX ADMIN — SACUBITRIL AND VALSARTAN 1 TABLET(S): 24; 26 TABLET, FILM COATED ORAL at 18:27

## 2019-01-27 RX ADMIN — Medication 20 MILLIEQUIVALENT(S): at 18:26

## 2019-01-27 RX ADMIN — Medication 3: at 12:40

## 2019-01-27 RX ADMIN — SACUBITRIL AND VALSARTAN 1 TABLET(S): 24; 26 TABLET, FILM COATED ORAL at 06:34

## 2019-01-27 RX ADMIN — FAMOTIDINE 20 MILLIGRAM(S): 10 INJECTION INTRAVENOUS at 06:35

## 2019-01-27 RX ADMIN — RIVAROXABAN 20 MILLIGRAM(S): KIT at 18:27

## 2019-01-27 RX ADMIN — FAMOTIDINE 20 MILLIGRAM(S): 10 INJECTION INTRAVENOUS at 18:28

## 2019-01-27 RX ADMIN — Medication 50 GRAM(S): at 06:42

## 2019-01-27 RX ADMIN — CARVEDILOL PHOSPHATE 25 MILLIGRAM(S): 80 CAPSULE, EXTENDED RELEASE ORAL at 20:18

## 2019-01-27 RX ADMIN — PANTOPRAZOLE SODIUM 40 MILLIGRAM(S): 20 TABLET, DELAYED RELEASE ORAL at 06:35

## 2019-01-27 RX ADMIN — BUDESONIDE AND FORMOTEROL FUMARATE DIHYDRATE 2 PUFF(S): 160; 4.5 AEROSOL RESPIRATORY (INHALATION) at 06:35

## 2019-01-27 RX ADMIN — Medication 40 MILLIGRAM(S): at 06:35

## 2019-01-27 NOTE — PROGRESS NOTE ADULT - PROBLEM SELECTOR PROBLEM 6
Type 2 diabetes mellitus with other circulatory complication, without long-term current use of insulin Patient expressed no known problems or needs

## 2019-01-27 NOTE — PROVIDER CONTACT NOTE (OTHER) - ASSESSMENT
AOX4, Denies cp,no sob, vss
A&Ox4, asymptomatic, VSS
patient resting asymptomatic. denies CP, SOB, Palpitations, Dizziness.

## 2019-01-27 NOTE — PROVIDER CONTACT NOTE (OTHER) - SITUATION
patient on tele had 8 beats WCT
patient with 5b of WCT on tele
Tele showed 16 beats of WCT, then sinus kae

## 2019-01-27 NOTE — PROGRESS NOTE ADULT - SUBJECTIVE AND OBJECTIVE BOX
overnight tele WCT 16 beats noted; denies dizziness or CP or palpitations this morning    Vital Signs Last 24 Hrs  T(C): 36.4 (27 Jan 2019 04:01), Max: 36.4 (26 Jan 2019 20:54)  T(F): 97.6 (27 Jan 2019 04:01), Max: 97.6 (27 Jan 2019 04:01)  HR: 65 (27 Jan 2019 04:01) (62 - 76)  BP: 136/83 (27 Jan 2019 04:01) (123/78 - 136/83)  BP(mean): --  RR: 18 (27 Jan 2019 04:01) (18 - 18)  SpO2: 99% (27 Jan 2019 04:01) (98% - 99%)    GENERAL: NAD, AAOx3  HEAD:  Atraumatic, Normocephalic  EYES: EOMI, PERRLA, conjunctiva and sclera clear  NECK: Supple, No JVD, No LAD  CHEST/LUNG: improving bibasilar rales  HEART: Irregular rate and rhythm; No murmurs, rubs, or gallops  ABDOMEN: Soft, Nontender, Nondistended; Bowel sounds present  EXTREMITIES:  2+ Peripheral Pulses, No clubbing, cyanosis, or edema  SKIN: No rashes or lesions  NEURO: dysphasia from prior CVA    LABS:                        13.0   4.27  )-----------( 109      ( 26 Jan 2019 09:08 )             40.3     01-27    142  |  105  |  29<H>  ----------------------------<  107<H>  3.3<L>   |  24  |  0.95    Ca    9.2      27 Jan 2019 06:16  Phos  3.5     01-27  Mg     2.0     01-27        CAPILLARY BLOOD GLUCOSE      POCT Blood Glucose.: 99 mg/dL (27 Jan 2019 07:54)  POCT Blood Glucose.: 101 mg/dL (26 Jan 2019 21:49)  POCT Blood Glucose.: 271 mg/dL (26 Jan 2019 16:36)  POCT Blood Glucose.: 164 mg/dL (26 Jan 2019 11:52)

## 2019-01-27 NOTE — CHART NOTE - NSCHARTNOTEFT_GEN_A_CORE
Called  by  Rn   that  pt  had  16  beats  of  WCT  .  Pt  was  seen  and  examined,  pt  denies  any  chest  pain ,  palpitation   or  shortness  of  breath.   Pt  was  hemodynamically  stable.   Patient is a 69y old  Female who presents with a chief complaint of SOB, coughing (26 Jan 2019 10:03)      Vital Signs Last 24 Hrs  T(C): 36.4 (26 Jan 2019 20:54), Max: 36.6 (26 Jan 2019 04:06)  T(F): 97.5 (26 Jan 2019 20:54), Max: 97.8 (26 Jan 2019 04:06)  HR: 62 (27 Jan 2019 00:43) (62 - 76)  BP: 129/71 (27 Jan 2019 00:43) (123/78 - 134/84)  BP(mean): --  RR: 18 (27 Jan 2019 00:43) (18 - 18)  SpO2: 99% (27 Jan 2019 00:43) (98% - 100%)      Labs:                          13.0   4.27  )-----------( 109      ( 26 Jan 2019 09:08 )             40.3     01-26    142  |  108  |  31<H>  ----------------------------<  207<H>  3.7   |  23  |  0.97    Ca    9.1      26 Jan 2019 06:24  Phos  3.6     01-25  Mg     2.0     01-26    TPro  7.6  /  Alb  4.1  /  TBili  0.8  /  DBili  x   /  AST  19  /  ALT  9<L>  /  AlkPhos  74  01-25        Radiology:    Physical Exam:  General: WN/WD NAD  Neurology: A&Ox3, nonfocal, SOTO x 4  Head:  Normocephalic, atraumatic  Respiratory: CTA B/L  CV: RRR, S1S2, no murmur  Abdominal: Soft, NT, ND no palpable mass  MSK: No edema, + peripheral pulses, FROM all 4 extremity    Assessment & Plan:  HPI:  69F c hx HTN, HLD, DM2, CAD s/p stents, combined BivCHF (EF 30% in 2014) s/p AICD, hypothyroidism, Afib on xarelto, CVA (2007, 2013) c/b residual mild left hemiparesis and moderate expressive aphasia, GERD, pw 2 days SOB, coughing, initially observed in CDU, now admitted with persistent SOB.  Now  with  16  beats  WCT    >  EKG   In the  am  >  F/  U    BMP   >  Continue   to  monitor  on  tele  >Follow up with Attending in AM.

## 2019-01-27 NOTE — PROVIDER CONTACT NOTE (OTHER) - RECOMMENDATIONS
Continue to monitor
Will continue to monitor. Patient was treated with 2mg Magnesium sulfate IVPB 1/25/2019. Mag. Serum  Am LAB ordered.

## 2019-01-27 NOTE — PROVIDER CONTACT NOTE (OTHER) - ACTION/TREATMENT ORDERED:
Continue to monitor.
NP notified and aware, magnesium 2gm ordered for repletion, will monitor.
WILL CONTINUE TO monitor. And treat as ordered. Mg serum AM Labs.

## 2019-01-27 NOTE — PROGRESS NOTE ADULT - SUBJECTIVE AND OBJECTIVE BOX
Subjective: pt seen and examined, no complaints, ROS - .     Tele events noted of overnight     amLODIPine   Tablet 5 milliGRAM(s) Oral daily  aspirin enteric coated 81 milliGRAM(s) Oral daily  buDESOnide  80 MICROgram(s)/formoterol 4.5 MICROgram(s) Inhaler 2 Puff(s) Inhalation two times a day  carvedilol 25 milliGRAM(s) Oral every 12 hours  dextrose 40% Gel 15 Gram(s) Oral once PRN  dextrose 5%. 1000 milliLiter(s) IV Continuous <Continuous>  dextrose 50% Injectable 12.5 Gram(s) IV Push once  dextrose 50% Injectable 25 Gram(s) IV Push once  dextrose 50% Injectable 25 Gram(s) IV Push once  famotidine    Tablet 20 milliGRAM(s) Oral two times a day  furosemide   Injectable 40 milliGRAM(s) IV Push daily  glucagon  Injectable 1 milliGRAM(s) IntraMuscular once PRN  insulin lispro (HumaLOG) corrective regimen sliding scale   SubCutaneous three times a day before meals  insulin lispro (HumaLOG) corrective regimen sliding scale   SubCutaneous at bedtime  levothyroxine 112 MICROGram(s) Oral daily  pantoprazole    Tablet 40 milliGRAM(s) Oral before breakfast  rivaroxaban 20 milliGRAM(s) Oral every 24 hours  sacubitril 49 mG/valsartan 51 mG 1 Tablet(s) Oral two times a day                            13.0   4.27  )-----------( 109      ( 26 Jan 2019 09:08 )             40.3       Hemoglobin: 13.0 g/dL (01-26 @ 09:08)  Hemoglobin: 13.6 g/dL (01-25 @ 07:40)  Hemoglobin: 14.2 g/dL (01-24 @ 10:24)      01-27    142  |  105  |  29<H>  ----------------------------<  107<H>  3.3<L>   |  24  |  0.95    Ca    9.2      27 Jan 2019 06:16  Mg     2.0     01-26      Creatinine Trend: 0.95<--, 0.97<--, 0.99<--, 0.82<--, 0.83<--    COAGS:     CARDIAC MARKERS ( 25 Jan 2019 06:34 )  x     / x     / 49 U/L / x     / 2.4 ng/mL        T(C): 36.4 (01-27-19 @ 04:01), Max: 36.4 (01-26-19 @ 20:54)  HR: 65 (01-27-19 @ 04:01) (62 - 76)  BP: 136/83 (01-27-19 @ 04:01) (123/78 - 136/83)  RR: 18 (01-27-19 @ 04:01) (18 - 18)  SpO2: 99% (01-27-19 @ 04:01) (98% - 99%)  Wt(kg): --    I&O's Summary    26 Jan 2019 07:01  -  27 Jan 2019 07:00  --------------------------------------------------------  IN: 884 mL / OUT: 0 mL / NET: 884 mL          Gen: Appears well in NAD  HEENT:  (-)icterus (-)pallor  CV: N S1 S2 1/6 ERICK (+)2 Pulses B/l  Resp:  Clear to ausculatation B/L, normal effort  GI: (+) BS Soft, NT, ND  Lymph:  (-)Edema, (-)obvious lymphadenopathy  Skin: Warm to touch, Normal turgor  Psych: Appropriate mood and affect        TELEMETRY: 	 Sinus     ECG:  	sinus 85 BPM, LVH, IVCD    RADIOLOGY:         CXR:  Mild pulmonary edema  ECHO: < from: Transthoracic Echocardiogram (01.24.19 @ 14:51) >  Conclusions:  1. Tethered mitral valve leaflets with normal opening.  Moderate mitral regurgitation.  2. Calcified trileaflet aortic valve with normal opening.  3. Severely dilated left atrium.  LA volume index = 55  cc/m2.  4. Moderate left ventricular enlargement.  5. Severe global left ventricular systolic dysfunction.  6. Moderate right atrial enlargement. A device wire is  noted in the right heart.  7. Normal right ventricular size with decreased right  ventricular systolic function.  8. Normal tricuspid valve. Mild-moderate tricuspid  regurgitation.  9. Estimated pulmonary artery systolic pressure equals 52  mm Hg, assuming right atrial pressure equals 8 mm Hg,  consistent with moderate pulmonary pressures.    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  tele with WCT     Pt k this AM 3.3 , would replace aggressively  goal 4.5   A/C with xarelto   cont aggressive IV diuresis , monitor Creatine    GI / DVT prophylaxis.   keep K>4, mag >2.0   Echo pending   AICD interrogation pending   COnt rate control with BB at present   A/C with xarelto for Afib   D/W Dr Lino

## 2019-01-27 NOTE — PROVIDER CONTACT NOTE (OTHER) - BACKGROUND
Pt admitted with CHF Exacerbation .On IV Lasix. 5 beats WCT on 1/25 /2019
patient admitted with chf exacerbation, on IV asix
With hx of CHF,  EF 26%

## 2019-01-27 NOTE — PROGRESS NOTE ADULT - PROBLEM SELECTOR PLAN 4
- cont xarelto, coreg  - will need EP consult tomorrow to interrogate device given acute CHF and WCT

## 2019-01-28 LAB
ANION GAP SERPL CALC-SCNC: 11 MMOL/L — SIGNIFICANT CHANGE UP (ref 5–17)
BUN SERPL-MCNC: 27 MG/DL — HIGH (ref 7–23)
CALCIUM SERPL-MCNC: 8.8 MG/DL — SIGNIFICANT CHANGE UP (ref 8.4–10.5)
CHLORIDE SERPL-SCNC: 107 MMOL/L — SIGNIFICANT CHANGE UP (ref 96–108)
CO2 SERPL-SCNC: 23 MMOL/L — SIGNIFICANT CHANGE UP (ref 22–31)
CREAT SERPL-MCNC: 0.84 MG/DL — SIGNIFICANT CHANGE UP (ref 0.5–1.3)
GLUCOSE BLDC GLUCOMTR-MCNC: 160 MG/DL — HIGH (ref 70–99)
GLUCOSE BLDC GLUCOMTR-MCNC: 162 MG/DL — HIGH (ref 70–99)
GLUCOSE BLDC GLUCOMTR-MCNC: 191 MG/DL — HIGH (ref 70–99)
GLUCOSE BLDC GLUCOMTR-MCNC: 209 MG/DL — HIGH (ref 70–99)
GLUCOSE SERPL-MCNC: 195 MG/DL — HIGH (ref 70–99)
HCT VFR BLD CALC: 40.7 % — SIGNIFICANT CHANGE UP (ref 34.5–45)
HGB BLD-MCNC: 13.1 G/DL — SIGNIFICANT CHANGE UP (ref 11.5–15.5)
MAGNESIUM SERPL-MCNC: 2.2 MG/DL — SIGNIFICANT CHANGE UP (ref 1.6–2.6)
MCHC RBC-ENTMCNC: 25 PG — LOW (ref 27–34)
MCHC RBC-ENTMCNC: 32.2 GM/DL — SIGNIFICANT CHANGE UP (ref 32–36)
MCV RBC AUTO: 77.5 FL — LOW (ref 80–100)
PLATELET # BLD AUTO: 113 K/UL — LOW (ref 150–400)
POTASSIUM SERPL-MCNC: 4 MMOL/L — SIGNIFICANT CHANGE UP (ref 3.5–5.3)
POTASSIUM SERPL-SCNC: 4 MMOL/L — SIGNIFICANT CHANGE UP (ref 3.5–5.3)
RBC # BLD: 5.25 M/UL — HIGH (ref 3.8–5.2)
RBC # FLD: 15.1 % — HIGH (ref 10.3–14.5)
SODIUM SERPL-SCNC: 141 MMOL/L — SIGNIFICANT CHANGE UP (ref 135–145)
WBC # BLD: 4.04 K/UL — SIGNIFICANT CHANGE UP (ref 3.8–10.5)
WBC # FLD AUTO: 4.04 K/UL — SIGNIFICANT CHANGE UP (ref 3.8–10.5)

## 2019-01-28 PROCEDURE — 93282 PRGRMG EVAL IMPLANTABLE DFB: CPT | Mod: 26,76

## 2019-01-28 RX ADMIN — FAMOTIDINE 20 MILLIGRAM(S): 10 INJECTION INTRAVENOUS at 16:56

## 2019-01-28 RX ADMIN — CARVEDILOL PHOSPHATE 25 MILLIGRAM(S): 80 CAPSULE, EXTENDED RELEASE ORAL at 16:57

## 2019-01-28 RX ADMIN — PANTOPRAZOLE SODIUM 40 MILLIGRAM(S): 20 TABLET, DELAYED RELEASE ORAL at 06:37

## 2019-01-28 RX ADMIN — BUDESONIDE AND FORMOTEROL FUMARATE DIHYDRATE 2 PUFF(S): 160; 4.5 AEROSOL RESPIRATORY (INHALATION) at 06:37

## 2019-01-28 RX ADMIN — SACUBITRIL AND VALSARTAN 1 TABLET(S): 24; 26 TABLET, FILM COATED ORAL at 16:57

## 2019-01-28 RX ADMIN — CARVEDILOL PHOSPHATE 25 MILLIGRAM(S): 80 CAPSULE, EXTENDED RELEASE ORAL at 06:37

## 2019-01-28 RX ADMIN — Medication 2: at 16:57

## 2019-01-28 RX ADMIN — RIVAROXABAN 20 MILLIGRAM(S): KIT at 16:57

## 2019-01-28 RX ADMIN — BUDESONIDE AND FORMOTEROL FUMARATE DIHYDRATE 2 PUFF(S): 160; 4.5 AEROSOL RESPIRATORY (INHALATION) at 16:57

## 2019-01-28 RX ADMIN — Medication 81 MILLIGRAM(S): at 12:22

## 2019-01-28 RX ADMIN — Medication 112 MICROGRAM(S): at 06:36

## 2019-01-28 RX ADMIN — Medication 1: at 12:22

## 2019-01-28 RX ADMIN — Medication 40 MILLIGRAM(S): at 06:37

## 2019-01-28 RX ADMIN — SACUBITRIL AND VALSARTAN 1 TABLET(S): 24; 26 TABLET, FILM COATED ORAL at 06:36

## 2019-01-28 RX ADMIN — FAMOTIDINE 20 MILLIGRAM(S): 10 INJECTION INTRAVENOUS at 06:36

## 2019-01-28 RX ADMIN — Medication 1: at 08:29

## 2019-01-28 RX ADMIN — AMLODIPINE BESYLATE 5 MILLIGRAM(S): 2.5 TABLET ORAL at 06:37

## 2019-01-28 NOTE — PROGRESS NOTE ADULT - SUBJECTIVE AND OBJECTIVE BOX
S: no chest pain; still with orthopnea     amLODIPine   Tablet 5 milliGRAM(s) Oral daily  aspirin enteric coated 81 milliGRAM(s) Oral daily  buDESOnide  80 MICROgram(s)/formoterol 4.5 MICROgram(s) Inhaler 2 Puff(s) Inhalation two times a day  carvedilol 25 milliGRAM(s) Oral every 12 hours  dextrose 40% Gel 15 Gram(s) Oral once PRN  dextrose 5%. 1000 milliLiter(s) IV Continuous <Continuous>  dextrose 50% Injectable 12.5 Gram(s) IV Push once  dextrose 50% Injectable 25 Gram(s) IV Push once  dextrose 50% Injectable 25 Gram(s) IV Push once  famotidine    Tablet 20 milliGRAM(s) Oral two times a day  furosemide   Injectable 40 milliGRAM(s) IV Push daily  glucagon  Injectable 1 milliGRAM(s) IntraMuscular once PRN  insulin lispro (HumaLOG) corrective regimen sliding scale   SubCutaneous three times a day before meals  insulin lispro (HumaLOG) corrective regimen sliding scale   SubCutaneous at bedtime  levothyroxine 112 MICROGram(s) Oral daily  pantoprazole    Tablet 40 milliGRAM(s) Oral before breakfast  rivaroxaban 20 milliGRAM(s) Oral every 24 hours  sacubitril 49 mG/valsartan 51 mG 1 Tablet(s) Oral two times a day                            13.1   4.04  )-----------( 113      ( 28 Jan 2019 08:28 )             40.7       01-28    141  |  107  |  27<H>  ----------------------------<  195<H>  4.0   |  23  |  0.84    Ca    8.8      28 Jan 2019 06:25  Phos  3.5     01-27  Mg     2.2     01-28              T(C): 36.6 (01-28-19 @ 04:01), Max: 36.6 (01-28-19 @ 04:01)  HR: 64 (01-28-19 @ 06:33) (64 - 78)  BP: 125/80 (01-28-19 @ 06:33) (104/65 - 125/80)  RR: 18 (01-28-19 @ 06:33) (18 - 18)  SpO2: 98% (01-28-19 @ 06:33) (98% - 100%)  Wt(kg): --    I&O's Summary    27 Jan 2019 07:01  -  28 Jan 2019 07:00  --------------------------------------------------------  IN: 118 mL / OUT: 0 mL / NET: 118 mL      Heart: normal S1, S2, RRR, no m/r/g  Lungs: cta b/l  Abd: soft nT  Ext: no edema         TELEMETRY: SR, PVC's    ECG:  	sinus 85 BPM, LVH, IVCD    RADIOLOGY:         CXR:  Mild pulmonary edema  ECHO: < from: Transthoracic Echocardiogram (01.24.19 @ 14:51) >  Conclusions:  1. Tethered mitral valve leaflets with normal opening.  Moderate mitral regurgitation.  2. Calcified trileaflet aortic valve with normal opening.  3. Severely dilated left atrium.  LA volume index = 55  cc/m2.  4. Moderate left ventricular enlargement.  5. Severe global left ventricular systolic dysfunction.  6. Moderate right atrial enlargement. A device wire is  noted in the right heart.  7. Normal right ventricular size with decreased right  ventricular systolic function.  8. Normal tricuspid valve. Mild-moderate tricuspid  regurgitation.  9. Estimated pulmonary artery systolic pressure equals 52  mm Hg, assuming right atrial pressure equals 8 mm Hg,  consistent with moderate pulmonary pressures.    < end of copied text >      ASSESSMENT/PLAN: 	69y Female HTN, HLD, DM2, CAD s/p stents, ICM combined BivCHF (EF 30% in 2014) s/p single lead ICD, hypothyroidism, Afib on xarelto, CVA (2007, 2013) c/b residual mild left hemiparesis and moderate expressive aphasia, GERD admitted with acute on chronic systolic heart failure.     -pt. still with orthopnea  -would continue with IV diuresis to keep O > I  -TTE with severe LV dysfunction and moderate MR  -obtain prior / outpatient records  -keep K > 4, Mg > 2    Raul Rivers MD

## 2019-01-28 NOTE — PROCEDURE NOTE - ADDITIONAL PROCEDURE DETAILS
1. Battery longevity 5.0-6.6 years  2. Measured data WNL  3. Sensing and pacing thresholds adequate  4. Not PM dependent, Vpaced <1%  5. No ventricular arrhythmia episodes noted  6. Normal ICD function  7. No programming changes done

## 2019-01-28 NOTE — PROGRESS NOTE ADULT - SUBJECTIVE AND OBJECTIVE BOX
Still with some lingering KRAUS    98.2  76   18  129/76   96% RA    GENERAL: NAD, AAOx3  HEAD:  Atraumatic, Normocephalic  EYES: EOMI, PERRLA, conjunctiva and sclera clear  NECK: Supple, No JVD, No LAD  CHEST/LUNG: improving bibasilar rales  HEART: Irregular rate and rhythm; No murmurs, rubs, or gallops  ABDOMEN: Soft, Nontender, Nondistended; Bowel sounds present  EXTREMITIES:  2+ Peripheral Pulses, No clubbing, cyanosis, or edema  SKIN: No rashes or lesions  NEURO: dysphasia from prior CVA    Labs reivewed

## 2019-01-29 LAB
ANION GAP SERPL CALC-SCNC: 9 MMOL/L — SIGNIFICANT CHANGE UP (ref 5–17)
BUN SERPL-MCNC: 25 MG/DL — HIGH (ref 7–23)
CALCIUM SERPL-MCNC: 9.3 MG/DL — SIGNIFICANT CHANGE UP (ref 8.4–10.5)
CHLORIDE SERPL-SCNC: 106 MMOL/L — SIGNIFICANT CHANGE UP (ref 96–108)
CO2 SERPL-SCNC: 26 MMOL/L — SIGNIFICANT CHANGE UP (ref 22–31)
CREAT SERPL-MCNC: 0.92 MG/DL — SIGNIFICANT CHANGE UP (ref 0.5–1.3)
GLUCOSE BLDC GLUCOMTR-MCNC: 131 MG/DL — HIGH (ref 70–99)
GLUCOSE BLDC GLUCOMTR-MCNC: 201 MG/DL — HIGH (ref 70–99)
GLUCOSE BLDC GLUCOMTR-MCNC: 230 MG/DL — HIGH (ref 70–99)
GLUCOSE BLDC GLUCOMTR-MCNC: 275 MG/DL — HIGH (ref 70–99)
GLUCOSE SERPL-MCNC: 126 MG/DL — HIGH (ref 70–99)
HCT VFR BLD CALC: 42.1 % — SIGNIFICANT CHANGE UP (ref 34.5–45)
HGB BLD-MCNC: 13.3 G/DL — SIGNIFICANT CHANGE UP (ref 11.5–15.5)
MAGNESIUM SERPL-MCNC: 2 MG/DL — SIGNIFICANT CHANGE UP (ref 1.6–2.6)
MCHC RBC-ENTMCNC: 25.1 PG — LOW (ref 27–34)
MCHC RBC-ENTMCNC: 31.6 GM/DL — LOW (ref 32–36)
MCV RBC AUTO: 79.6 FL — LOW (ref 80–100)
PLATELET # BLD AUTO: 133 K/UL — LOW (ref 150–400)
POTASSIUM SERPL-MCNC: 4 MMOL/L — SIGNIFICANT CHANGE UP (ref 3.5–5.3)
POTASSIUM SERPL-SCNC: 4 MMOL/L — SIGNIFICANT CHANGE UP (ref 3.5–5.3)
RBC # BLD: 5.29 M/UL — HIGH (ref 3.8–5.2)
RBC # FLD: 15 % — HIGH (ref 10.3–14.5)
SODIUM SERPL-SCNC: 141 MMOL/L — SIGNIFICANT CHANGE UP (ref 135–145)
WBC # BLD: 3.89 K/UL — SIGNIFICANT CHANGE UP (ref 3.8–10.5)
WBC # FLD AUTO: 3.89 K/UL — SIGNIFICANT CHANGE UP (ref 3.8–10.5)

## 2019-01-29 RX ADMIN — SACUBITRIL AND VALSARTAN 1 TABLET(S): 24; 26 TABLET, FILM COATED ORAL at 17:04

## 2019-01-29 RX ADMIN — Medication 81 MILLIGRAM(S): at 12:32

## 2019-01-29 RX ADMIN — BUDESONIDE AND FORMOTEROL FUMARATE DIHYDRATE 2 PUFF(S): 160; 4.5 AEROSOL RESPIRATORY (INHALATION) at 06:10

## 2019-01-29 RX ADMIN — Medication 2: at 12:31

## 2019-01-29 RX ADMIN — Medication 112 MICROGRAM(S): at 06:09

## 2019-01-29 RX ADMIN — RIVAROXABAN 20 MILLIGRAM(S): KIT at 17:02

## 2019-01-29 RX ADMIN — BUDESONIDE AND FORMOTEROL FUMARATE DIHYDRATE 2 PUFF(S): 160; 4.5 AEROSOL RESPIRATORY (INHALATION) at 17:03

## 2019-01-29 RX ADMIN — FAMOTIDINE 20 MILLIGRAM(S): 10 INJECTION INTRAVENOUS at 06:09

## 2019-01-29 RX ADMIN — PANTOPRAZOLE SODIUM 40 MILLIGRAM(S): 20 TABLET, DELAYED RELEASE ORAL at 06:09

## 2019-01-29 RX ADMIN — Medication 40 MILLIGRAM(S): at 06:10

## 2019-01-29 RX ADMIN — CARVEDILOL PHOSPHATE 25 MILLIGRAM(S): 80 CAPSULE, EXTENDED RELEASE ORAL at 06:09

## 2019-01-29 RX ADMIN — CARVEDILOL PHOSPHATE 25 MILLIGRAM(S): 80 CAPSULE, EXTENDED RELEASE ORAL at 17:03

## 2019-01-29 RX ADMIN — AMLODIPINE BESYLATE 5 MILLIGRAM(S): 2.5 TABLET ORAL at 06:09

## 2019-01-29 RX ADMIN — FAMOTIDINE 20 MILLIGRAM(S): 10 INJECTION INTRAVENOUS at 17:03

## 2019-01-29 RX ADMIN — Medication 3: at 17:01

## 2019-01-29 RX ADMIN — SACUBITRIL AND VALSARTAN 1 TABLET(S): 24; 26 TABLET, FILM COATED ORAL at 06:10

## 2019-01-29 NOTE — PROGRESS NOTE ADULT - PROBLEM SELECTOR PLAN 1
- CXR showing pulm edema  - cont lasix 40mg IV qday  - tele  - TTE showing ?comparable/grossly unchanged EF 26%, bivHF, mod MR  - cont coreg   - appreciate cardiology - CXR showing pulm edema  - cont lasix 40mg IV qday  - tele  - TTE showing ?comparable/grossly unchanged EF 26%, bivHF, mod MR  - cont coreg   - NICOLE under consideration to evaluate for occult severe MR if pt agreeable  - appreciate cardiology

## 2019-01-29 NOTE — PROGRESS NOTE ADULT - PROBLEM SELECTOR PLAN 5
- cont asa, statin, xarelto  - PT eval  - speech/swallow eval - cont asa, statin, xarelto  - PT eval underway

## 2019-01-29 NOTE — PROGRESS NOTE ADULT - SUBJECTIVE AND OBJECTIVE BOX
GENERAL: NAD, AAOx3  HEAD:  Atraumatic, Normocephalic  EYES: EOMI, PERRLA, conjunctiva and sclera clear  NECK: Supple, No JVD, No LAD  CHEST/LUNG: improving bibasilar rales  HEART: Irregular rate and rhythm; No murmurs, rubs, or gallops  ABDOMEN: Soft, Nontender, Nondistended; Bowel sounds present  EXTREMITIES:  2+ Peripheral Pulses, No clubbing, cyanosis, or edema  SKIN: No rashes or lesions  NEURO: dysphasia from prior CVA Denies any acute CP  No acute SOB    Vital Signs Last 24 Hrs  T(C): 36.5 (29 Jan 2019 20:28), Max: 36.5 (29 Jan 2019 11:46)  T(F): 97.7 (29 Jan 2019 20:28), Max: 97.7 (29 Jan 2019 11:46)  HR: 64 (29 Jan 2019 20:28) (63 - 66)  BP: 125/74 (29 Jan 2019 20:28) (107/69 - 134/75)  BP(mean): --  RR: 18 (29 Jan 2019 20:28) (18 - 18)  SpO2: 99% (29 Jan 2019 20:28) (99% - 100%)    GENERAL: NAD, AAOx3  HEAD:  Atraumatic, Normocephalic  EYES: EOMI, PERRLA, conjunctiva and sclera clear  NECK: Supple, No JVD, No LAD  CHEST/LUNG: improving bibasilar rales  HEART: Irregular rate and rhythm; No murmurs, rubs, or gallops  ABDOMEN: Soft, Nontender, Nondistended; Bowel sounds present  EXTREMITIES:  2+ Peripheral Pulses, No clubbing, cyanosis, or edema  SKIN: No rashes or lesions  NEURO: dysphasia from prior CVA    LABS:                        13.3   3.89  )-----------( 133      ( 29 Jan 2019 08:01 )             42.1     01-29    141  |  106  |  25<H>  ----------------------------<  126<H>  4.0   |  26  |  0.92    Ca    9.3      29 Jan 2019 05:44  Mg     2.0     01-29        CAPILLARY BLOOD GLUCOSE      POCT Blood Glucose.: 201 mg/dL (29 Jan 2019 21:27)  POCT Blood Glucose.: 275 mg/dL (29 Jan 2019 16:45)  POCT Blood Glucose.: 230 mg/dL (29 Jan 2019 11:46)  POCT Blood Glucose.: 131 mg/dL (29 Jan 2019 08:15)

## 2019-01-29 NOTE — PROGRESS NOTE ADULT - PROBLEM SELECTOR PLAN 4
- cont xarelto, coreg  - will need EP consult tomorrow to interrogate device given acute CHF and WCT - cont xarelto, coreg  - ICD interrogation unremarkable

## 2019-01-29 NOTE — PROGRESS NOTE ADULT - ASSESSMENT
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

## 2019-01-29 NOTE — PROGRESS NOTE ADULT - SUBJECTIVE AND OBJECTIVE BOX
Subjective: pt seen and examined, no complaints, ROS - .     amLODIPine   Tablet 5 milliGRAM(s) Oral daily  aspirin enteric coated 81 milliGRAM(s) Oral daily  buDESOnide  80 MICROgram(s)/formoterol 4.5 MICROgram(s) Inhaler 2 Puff(s) Inhalation two times a day  carvedilol 25 milliGRAM(s) Oral every 12 hours  dextrose 40% Gel 15 Gram(s) Oral once PRN  dextrose 5%. 1000 milliLiter(s) IV Continuous <Continuous>  dextrose 50% Injectable 12.5 Gram(s) IV Push once  dextrose 50% Injectable 25 Gram(s) IV Push once  dextrose 50% Injectable 25 Gram(s) IV Push once  famotidine    Tablet 20 milliGRAM(s) Oral two times a day  furosemide   Injectable 40 milliGRAM(s) IV Push daily  glucagon  Injectable 1 milliGRAM(s) IntraMuscular once PRN  insulin lispro (HumaLOG) corrective regimen sliding scale   SubCutaneous three times a day before meals  insulin lispro (HumaLOG) corrective regimen sliding scale   SubCutaneous at bedtime  levothyroxine 112 MICROGram(s) Oral daily  pantoprazole    Tablet 40 milliGRAM(s) Oral before breakfast  rivaroxaban 20 milliGRAM(s) Oral every 24 hours  sacubitril 49 mG/valsartan 51 mG 1 Tablet(s) Oral two times a day                            13.1   4.04  )-----------( 113      ( 28 Jan 2019 08:28 )             40.7       Hemoglobin: 13.1 g/dL (01-28 @ 08:28)  Hemoglobin: 13.1 g/dL (01-27 @ 08:15)  Hemoglobin: 13.0 g/dL (01-26 @ 09:08)  Hemoglobin: 13.6 g/dL (01-25 @ 07:40)  Hemoglobin: 14.2 g/dL (01-24 @ 10:24)      01-28    141  |  107  |  27<H>  ----------------------------<  195<H>  4.0   |  23  |  0.84    Ca    8.8      28 Jan 2019 06:25  Phos  3.5     01-27  Mg     2.2     01-28      Creatinine Trend: 0.84<--, 0.95<--, 0.97<--, 0.99<--, 0.82<--, 0.83<--    COAGS:           T(C): 36.4 (01-29-19 @ 04:14), Max: 36.4 (01-29-19 @ 04:14)  HR: 66 (01-29-19 @ 04:14) (60 - 66)  BP: 134/75 (01-29-19 @ 04:14) (122/71 - 134/75)  RR: 18 (01-29-19 @ 04:14) (17 - 18)  SpO2: 100% (01-29-19 @ 04:14) (97% - 100%)  Wt(kg): --    I&O's Summary    27 Jan 2019 07:01  -  28 Jan 2019 07:00  --------------------------------------------------------  IN: 118 mL / OUT: 0 mL / NET: 118 mL    28 Jan 2019 07:01  -  29 Jan 2019 05:07  --------------------------------------------------------  IN: 660 mL / OUT: 0 mL / NET: 660 mL      Gen: Appears well in NAD  HEENT:  (-)icterus (-)pallor  CV: N S1 S2 1/6 ERICK (+)2 Pulses B/l  Resp:  Clear to ausculatation B/L, normal effort  GI: (+) BS Soft, NT, ND  Lymph:  (-)Edema, (-)obvious lymphadenopathy  Skin: Warm to touch, Normal turgor  Psych: Appropriate mood and affect        TELEMETRY: 	 Sinus     ECG:  	sinus 85 BPM, LVH, IVCD    RADIOLOGY:         CXR:  Mild pulmonary edema    AICD interrogation :   1. Battery longevity 5.0-6.6 years  2. Measured data WNL  3. Sensing and pacing thresholds adequate  4. Not PM dependent, Vpaced <1%  5. No ventricular arrhythmia episodes noted  6. Normal ICD function  7. No programming changes done      ECHO : < from: Transthoracic Echocardiogram (01.24.19 @ 14:51) >  onclusions:  1. Tethered mitral valve leaflets with normal opening.  Moderate mitral regurgitation.  2. Calcified trileaflet aortic valve with normal opening.  3. Severely dilated left atrium.  LA volume index = 55  cc/m2.  4. Moderate left ventricular enlargement.  5. Severe global left ventricular systolic dysfunction.  6. Moderate right atrial enlargement. A device wire is  noted in the right heart.  7. Normal right ventricular size with decreased right  ventricular systolic function.  8. Normal tricuspid valve. Mild-moderate tricuspid  regurgitation.  9. Estimated pulmonary artery systolic pressure equals 52  mm Hg, assuming right atrial pressure equals 8 mm Hg,  consistent with moderate pulmonary pressures.    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    cont aggressive IV diuresis , monitor Creatine , daily weights , SOB improving per pt    GI / DVT prophylaxis,  keep K>4, mag >2.0   ECHO 1/24 noted    AICD interrogation noted    COnt rate control with BB at present   A/C with xarelto for Afib   D/W Dr Rivers     pager [phone] Subjective: pt seen and examined, no complaints, ROS - .     amLODIPine   Tablet 5 milliGRAM(s) Oral daily  aspirin enteric coated 81 milliGRAM(s) Oral daily  buDESOnide  80 MICROgram(s)/formoterol 4.5 MICROgram(s) Inhaler 2 Puff(s) Inhalation two times a day  carvedilol 25 milliGRAM(s) Oral every 12 hours  dextrose 40% Gel 15 Gram(s) Oral once PRN  dextrose 5%. 1000 milliLiter(s) IV Continuous <Continuous>  dextrose 50% Injectable 12.5 Gram(s) IV Push once  dextrose 50% Injectable 25 Gram(s) IV Push once  dextrose 50% Injectable 25 Gram(s) IV Push once  famotidine    Tablet 20 milliGRAM(s) Oral two times a day  furosemide   Injectable 40 milliGRAM(s) IV Push daily  glucagon  Injectable 1 milliGRAM(s) IntraMuscular once PRN  insulin lispro (HumaLOG) corrective regimen sliding scale   SubCutaneous three times a day before meals  insulin lispro (HumaLOG) corrective regimen sliding scale   SubCutaneous at bedtime  levothyroxine 112 MICROGram(s) Oral daily  pantoprazole    Tablet 40 milliGRAM(s) Oral before breakfast  rivaroxaban 20 milliGRAM(s) Oral every 24 hours  sacubitril 49 mG/valsartan 51 mG 1 Tablet(s) Oral two times a day                            13.1   4.04  )-----------( 113      ( 28 Jan 2019 08:28 )             40.7       Hemoglobin: 13.1 g/dL (01-28 @ 08:28)  Hemoglobin: 13.1 g/dL (01-27 @ 08:15)  Hemoglobin: 13.0 g/dL (01-26 @ 09:08)  Hemoglobin: 13.6 g/dL (01-25 @ 07:40)  Hemoglobin: 14.2 g/dL (01-24 @ 10:24)      01-28    141  |  107  |  27<H>  ----------------------------<  195<H>  4.0   |  23  |  0.84    Ca    8.8      28 Jan 2019 06:25  Phos  3.5     01-27  Mg     2.2     01-28      Creatinine Trend: 0.84<--, 0.95<--, 0.97<--, 0.99<--, 0.82<--, 0.83<--    COAGS:           T(C): 36.4 (01-29-19 @ 04:14), Max: 36.4 (01-29-19 @ 04:14)  HR: 66 (01-29-19 @ 04:14) (60 - 66)  BP: 134/75 (01-29-19 @ 04:14) (122/71 - 134/75)  RR: 18 (01-29-19 @ 04:14) (17 - 18)  SpO2: 100% (01-29-19 @ 04:14) (97% - 100%)  Wt(kg): --    I&O's Summary    27 Jan 2019 07:01  -  28 Jan 2019 07:00  --------------------------------------------------------  IN: 118 mL / OUT: 0 mL / NET: 118 mL    28 Jan 2019 07:01  -  29 Jan 2019 05:07  --------------------------------------------------------  IN: 660 mL / OUT: 0 mL / NET: 660 mL      Gen: Appears well in NAD  HEENT:  (-)icterus (-)pallor  CV: N S1 S2 1/6 ERICK (+)2 Pulses B/l  Resp:  Clear to ausculatation B/L, normal effort  GI: (+) BS Soft, NT, ND  Lymph:  (-)Edema, (-)obvious lymphadenopathy  Skin: Warm to touch, Normal turgor  Psych: Appropriate mood and affect        TELEMETRY: 	 Sinus     ECG:  	sinus 85 BPM, LVH, IVCD    RADIOLOGY:         CXR:  Mild pulmonary edema    AICD interrogation :   1. Battery longevity 5.0-6.6 years  2. Measured data WNL  3. Sensing and pacing thresholds adequate  4. Not PM dependent, Vpaced <1%  5. No ventricular arrhythmia episodes noted  6. Normal ICD function  7. No programming changes done      ECHO : < from: Transthoracic Echocardiogram (01.24.19 @ 14:51) >  onclusions:  1. Tethered mitral valve leaflets with normal opening.  Moderate mitral regurgitation.  2. Calcified trileaflet aortic valve with normal opening.  3. Severely dilated left atrium.  LA volume index = 55  cc/m2.  4. Moderate left ventricular enlargement.  5. Severe global left ventricular systolic dysfunction.  6. Moderate right atrial enlargement. A device wire is  noted in the right heart.  7. Normal right ventricular size with decreased right  ventricular systolic function.  8. Normal tricuspid valve. Mild-moderate tricuspid  regurgitation.  9. Estimated pulmonary artery systolic pressure equals 52  mm Hg, assuming right atrial pressure equals 8 mm Hg,  consistent with moderate pulmonary pressures.    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    cont aggressive IV diuresis , monitor Creatine , daily weights , SOB improving per pt    GI / DVT prophylaxis,  keep K>4, mag >2.0   ECHO 1/24 noted    AICD interrogation noted    COnt rate control with BB at present   A/C with xarelto for Afib   D/W Dr Lino    pager [phone]

## 2019-01-29 NOTE — PROGRESS NOTE ADULT - PROBLEM SELECTOR PROBLEM 1
Acute combined systolic and diastolic congestive heart failure Acute systolic (congestive) heart failure

## 2019-01-30 ENCOUNTER — TRANSCRIPTION ENCOUNTER (OUTPATIENT)
Age: 69
End: 2019-01-30

## 2019-01-30 DIAGNOSIS — Z29.9 ENCOUNTER FOR PROPHYLACTIC MEASURES, UNSPECIFIED: ICD-10-CM

## 2019-01-30 DIAGNOSIS — I50.21 ACUTE SYSTOLIC (CONGESTIVE) HEART FAILURE: ICD-10-CM

## 2019-01-30 LAB
ANION GAP SERPL CALC-SCNC: 12 MMOL/L — SIGNIFICANT CHANGE UP (ref 5–17)
BUN SERPL-MCNC: 24 MG/DL — HIGH (ref 7–23)
CALCIUM SERPL-MCNC: 9.3 MG/DL — SIGNIFICANT CHANGE UP (ref 8.4–10.5)
CHLORIDE SERPL-SCNC: 109 MMOL/L — HIGH (ref 96–108)
CO2 SERPL-SCNC: 24 MMOL/L — SIGNIFICANT CHANGE UP (ref 22–31)
CREAT SERPL-MCNC: 0.89 MG/DL — SIGNIFICANT CHANGE UP (ref 0.5–1.3)
GLUCOSE BLDC GLUCOMTR-MCNC: 152 MG/DL — HIGH (ref 70–99)
GLUCOSE BLDC GLUCOMTR-MCNC: 174 MG/DL — HIGH (ref 70–99)
GLUCOSE BLDC GLUCOMTR-MCNC: 212 MG/DL — HIGH (ref 70–99)
GLUCOSE BLDC GLUCOMTR-MCNC: 218 MG/DL — HIGH (ref 70–99)
GLUCOSE SERPL-MCNC: 156 MG/DL — HIGH (ref 70–99)
HCT VFR BLD CALC: 45.6 % — HIGH (ref 34.5–45)
HGB BLD-MCNC: 14.9 G/DL — SIGNIFICANT CHANGE UP (ref 11.5–15.5)
MAGNESIUM SERPL-MCNC: 2.1 MG/DL — SIGNIFICANT CHANGE UP (ref 1.6–2.6)
MCHC RBC-ENTMCNC: 25.9 PG — LOW (ref 27–34)
MCHC RBC-ENTMCNC: 32.7 GM/DL — SIGNIFICANT CHANGE UP (ref 32–36)
MCV RBC AUTO: 79.3 FL — LOW (ref 80–100)
PLATELET # BLD AUTO: 126 K/UL — LOW (ref 150–400)
POTASSIUM SERPL-MCNC: 3.7 MMOL/L — SIGNIFICANT CHANGE UP (ref 3.5–5.3)
POTASSIUM SERPL-SCNC: 3.7 MMOL/L — SIGNIFICANT CHANGE UP (ref 3.5–5.3)
RBC # BLD: 5.75 M/UL — HIGH (ref 3.8–5.2)
RBC # FLD: 15.1 % — HIGH (ref 10.3–14.5)
SODIUM SERPL-SCNC: 145 MMOL/L — SIGNIFICANT CHANGE UP (ref 135–145)
WBC # BLD: 3.78 K/UL — LOW (ref 3.8–10.5)
WBC # FLD AUTO: 3.78 K/UL — LOW (ref 3.8–10.5)

## 2019-01-30 RX ADMIN — PANTOPRAZOLE SODIUM 40 MILLIGRAM(S): 20 TABLET, DELAYED RELEASE ORAL at 05:56

## 2019-01-30 RX ADMIN — CARVEDILOL PHOSPHATE 25 MILLIGRAM(S): 80 CAPSULE, EXTENDED RELEASE ORAL at 05:54

## 2019-01-30 RX ADMIN — CARVEDILOL PHOSPHATE 25 MILLIGRAM(S): 80 CAPSULE, EXTENDED RELEASE ORAL at 17:57

## 2019-01-30 RX ADMIN — SACUBITRIL AND VALSARTAN 1 TABLET(S): 24; 26 TABLET, FILM COATED ORAL at 17:57

## 2019-01-30 RX ADMIN — Medication 2: at 12:38

## 2019-01-30 RX ADMIN — Medication 40 MILLIGRAM(S): at 05:56

## 2019-01-30 RX ADMIN — BUDESONIDE AND FORMOTEROL FUMARATE DIHYDRATE 2 PUFF(S): 160; 4.5 AEROSOL RESPIRATORY (INHALATION) at 05:56

## 2019-01-30 RX ADMIN — Medication 81 MILLIGRAM(S): at 11:00

## 2019-01-30 RX ADMIN — RIVAROXABAN 20 MILLIGRAM(S): KIT at 17:57

## 2019-01-30 RX ADMIN — SACUBITRIL AND VALSARTAN 1 TABLET(S): 24; 26 TABLET, FILM COATED ORAL at 05:53

## 2019-01-30 RX ADMIN — Medication 1: at 17:16

## 2019-01-30 RX ADMIN — BUDESONIDE AND FORMOTEROL FUMARATE DIHYDRATE 2 PUFF(S): 160; 4.5 AEROSOL RESPIRATORY (INHALATION) at 17:58

## 2019-01-30 RX ADMIN — FAMOTIDINE 20 MILLIGRAM(S): 10 INJECTION INTRAVENOUS at 05:54

## 2019-01-30 RX ADMIN — Medication 112 MICROGRAM(S): at 05:54

## 2019-01-30 RX ADMIN — AMLODIPINE BESYLATE 5 MILLIGRAM(S): 2.5 TABLET ORAL at 05:54

## 2019-01-30 RX ADMIN — FAMOTIDINE 20 MILLIGRAM(S): 10 INJECTION INTRAVENOUS at 17:57

## 2019-01-30 RX ADMIN — Medication 1: at 08:22

## 2019-01-30 NOTE — DISCHARGE NOTE ADULT - ADDITIONAL INSTRUCTIONS
Follow up with cardiologist - Dr. Tejinder Patel is next Friday Follow up with cardiologist - Dr. Tejinder Patel tomorrow (2/1/19)  Make appointments to follow up with all of your physicians - bring all discharge paperwork including discharge medication list to follow up appointments

## 2019-01-30 NOTE — DISCHARGE NOTE ADULT - MEDICATION SUMMARY - MEDICATIONS TO TAKE
I will START or STAY ON the medications listed below when I get home from the hospital:    Entresto 49 mg-51 mg oral tablet  -- 1 tab(s) by mouth 2 times a day  -- Indication: For Acute on chronic combined systolic and diastolic congestive heart failure    Xarelto 20 mg oral tablet  -- 1 tab(s) by mouth once a day (in the evening)  -- Indication: For Atrial fibrillation, unspecified type    Prandin 2 mg oral tablet  -- 1 tab(s) by mouth 2 times a day (before meals)  -- Indication: For Diabetes    Crestor 20 mg oral tablet  -- 1 tab(s) by mouth once a day (at bedtime)  -- Indication: For hyperlipidemia    carvedilol 25 mg oral tablet  -- 1 tab(s) by mouth 2 times a day  -- Indication: For Congestive heart failure    budesonide-formoterol 80 mcg-4.5 mcg/inh inhalation aerosol  -- 2 puff(s) inhaled 2 times a day   -- Indication: For Shortness of breath    amLODIPine 5 mg oral tablet  -- 1 tab(s) by mouth once a day  -- Indication: For hypertension    furosemide 40 mg oral tablet  -- 1 tab(s) by mouth once a day  -- Indication: For diuretic    levothyroxine 112 mcg (0.112 mg) oral tablet  -- 1 tab(s) by mouth once a day  -- Indication: For hypothyroid    cyanocobalamin 100 mcg oral tablet  -- 1 tab(s) by mouth once a day  -- Indication: For Suppliment

## 2019-01-30 NOTE — DIETITIAN INITIAL EVALUATION ADULT. - PERTINENT LABORATORY DATA
Fingersticks 1/29: 131 - 201, 1/30: 152 - 218, 1/30: Cl 109 (H), BUN 24 (H), Glu 156 (H), 1/25: A1C 7.6 Fingersticks 1/29: 131 - 275, 1/30: 152 - 218, 1/30 BUN 24 (H), Glu 156 (H), 1/25: A1C 7.6

## 2019-01-30 NOTE — DIETITIAN INITIAL EVALUATION ADULT. - OTHER INFO
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 P/w 2 days SOB and coughing, admitted for persistent SOB. Pt undergoing aggressive IV diuresis for JVD, SOB improving. S/p ECHO 1/24, planned NICOLE for further evaluation. Pt states weight has been stable with slight fluctuations around 177 lb x 1 year. Noted pt with 3.9 lb weight loss since admission as pt being diuresed. Pt reports good PO intake, >75% of meals. Denies N/V/D/C, reports +BM today 1/30. No noted edema, skin intact per nursing documentation. Pt expressed interest in diet education, Provided pt with written and verbal education about nutrition therapy for management of health, consistent carbohydrate and DASH diets. Pt verbalized understanding of education and good compliance is expected. RD to f/u to reinforce diet education.

## 2019-01-30 NOTE — DIETITIAN INITIAL EVALUATION ADULT. - PERTINENT MEDS FT
Pepcid, lasix, humalog sliding scale (at bedtime and before meals), protonix Pepcid, lasix, humalog sliding scale (at bedtime and before meals), protonix, synthroid

## 2019-01-30 NOTE — DISCHARGE NOTE ADULT - PROVIDER TOKENS
TOKCINDI:'[id number] GURPREET:'[id number]',GURPREET:'[id number]' TOKEN:'[id number]:'[id number]:'[id number]'

## 2019-01-30 NOTE — DIETITIAN INITIAL EVALUATION ADULT. - ADHERENCE
Pt reports following low sugar, low sodium diet, and avoids fried foods. Pt does not take insulin, takes diabetes medication (Prandin) and checks BG in the morning and evening with numbers ranging from 100-140 mg/dL. Pt reports taking vitamin B, D, CoQ10, and "[employer]" vitamins. Confirmed NKFA. No chewing or swallowing difficulties reported./good

## 2019-01-30 NOTE — PROGRESS NOTE ADULT - SUBJECTIVE AND OBJECTIVE BOX
Patient is a 69y old  Female who presents with a chief complaint of SOB, coughing (30 Jan 2019 05:09)      SUBJECTIVE / OVERNIGHT EVENTS:  sitting up out of bed.   No overnight events.  Pt feels well. no event on tele.   Denied cp, n/v/d.  no abdominal pain. No HA/dizziness.   breathing has much better.      Vital Signs Last 24 Hrs  T(C): 36.5 (30 Jan 2019 12:25), Max: 36.5 (29 Jan 2019 20:28)  T(F): 97.7 (30 Jan 2019 12:25), Max: 97.7 (29 Jan 2019 20:28)  HR: 64 (30 Jan 2019 12:25) (62 - 64)  BP: 124/72 (30 Jan 2019 12:25) (124/72 - 146/76)  BP(mean): --  RR: 18 (30 Jan 2019 12:25) (18 - 18)  SpO2: 100% (30 Jan 2019 12:25) (99% - 100%)  I&O's Summary    29 Jan 2019 07:01  -  30 Jan 2019 07:00  --------------------------------------------------------  IN: 900 mL / OUT: 750 mL / NET: 150 mL    30 Jan 2019 07:01  -  30 Jan 2019 14:17  --------------------------------------------------------  IN: 600 mL / OUT: 0 mL / NET: 600 mL        PHYSICAL EXAM:  GENERAL: NAD, Comfortable  HEAD:  Atraumatic, Normocephalic  EYES: EOMI, PERRLA, conjunctiva and sclera clear  NECK: Supple, No JVD  CHEST/LUNG: Clear to auscultation bilaterally; No wheeze  HEART: Irregular rate and rhythm; No murmurs, rubs, or gallops  ABDOMEN: Soft, Nontender, Nondistended; Bowel sounds present  Neuro: AAO x 3, no focal deficit, 5/5 b/l extremities  EXTREMITIES:  2+ Peripheral Pulses, No clubbing, cyanosis, or edema  SKIN: No rashes or lesions      LABS:                        14.9   3.78  )-----------( 126      ( 30 Jan 2019 08:09 )             45.6     01-30    145  |  109<H>  |  24<H>  ----------------------------<  156<H>  3.7   |  24  |  0.89    Ca    9.3      30 Jan 2019 07:02  Mg     2.1     01-30        CAPILLARY BLOOD GLUCOSE      POCT Blood Glucose.: 218 mg/dL (30 Jan 2019 12:15)  POCT Blood Glucose.: 152 mg/dL (30 Jan 2019 08:01)  POCT Blood Glucose.: 201 mg/dL (29 Jan 2019 21:27)  POCT Blood Glucose.: 275 mg/dL (29 Jan 2019 16:45)            RADIOLOGY & ADDITIONAL TESTS:    Imaging Personally Reviewed:  [x] YES  [ ] NO    Consultant(s) Notes Reviewed:  [x] YES  [ ] NO      MEDICATIONS  (STANDING):  amLODIPine   Tablet 5 milliGRAM(s) Oral daily  aspirin enteric coated 81 milliGRAM(s) Oral daily  buDESOnide  80 MICROgram(s)/formoterol 4.5 MICROgram(s) Inhaler 2 Puff(s) Inhalation two times a day  carvedilol 25 milliGRAM(s) Oral every 12 hours  dextrose 5%. 1000 milliLiter(s) (50 mL/Hr) IV Continuous <Continuous>  dextrose 50% Injectable 12.5 Gram(s) IV Push once  dextrose 50% Injectable 25 Gram(s) IV Push once  dextrose 50% Injectable 25 Gram(s) IV Push once  famotidine    Tablet 20 milliGRAM(s) Oral two times a day  furosemide   Injectable 40 milliGRAM(s) IV Push daily  insulin lispro (HumaLOG) corrective regimen sliding scale   SubCutaneous three times a day before meals  insulin lispro (HumaLOG) corrective regimen sliding scale   SubCutaneous at bedtime  levothyroxine 112 MICROGram(s) Oral daily  pantoprazole    Tablet 40 milliGRAM(s) Oral before breakfast  rivaroxaban 20 milliGRAM(s) Oral every 24 hours  sacubitril 49 mG/valsartan 51 mG 1 Tablet(s) Oral two times a day    MEDICATIONS  (PRN):  dextrose 40% Gel 15 Gram(s) Oral once PRN Blood Glucose LESS THAN 70 milliGRAM(s)/deciliter  glucagon  Injectable 1 milliGRAM(s) IntraMuscular once PRN Glucose LESS THAN 70 milligrams/deciliter      Care Discussed with Consultants/Other Providers [x] YES  [ ] NO    HEALTH ISSUES - PROBLEM Dx:  Need for prophylactic measure: Need for prophylactic measure  Acute systolic (congestive) heart failure: Acute systolic (congestive) heart failure  Suspected deep vein thrombosis  Type 2 diabetes mellitus with other circulatory complication, without long-term current use of insulin: Type 2 diabetes mellitus with other circulatory complication, without long-term current use of insulin  Cerebrovascular accident (CVA), unspecified mechanism: Cerebrovascular accident (CVA), unspecified mechanism  Atrial fibrillation, unspecified type: Atrial fibrillation, unspecified type  Coronary artery disease of native artery of native heart with stable angina pectoris: Coronary artery disease of native artery of native heart with stable angina pectoris  Chest pain, atypical: Chest pain, atypical  Acute combined systolic and diastolic congestive heart failure: Acute combined systolic and diastolic congestive heart failure

## 2019-01-30 NOTE — DISCHARGE NOTE ADULT - PATIENT PORTAL LINK FT
Mom picked up paper copy of prescription Vyvanse for [place].  
You can access the [facility] Patient Portal, offered by [facility], by registering with the following website: [email or web address]

## 2019-01-30 NOTE — DISCHARGE NOTE ADULT - PLAN OF CARE
Remain free of recurrence ICD interrogation unremarkable.  Weigh yourself daily.  If you gain 3lbs in 3 days, or 5lbs in a week call your Health Care Provider.  Do not eat or drink foods containing more than 2000mg of salt (sodium) in your diet every day.  Call your Health Care Provider if you have any swelling or increased swelling in your feet, ankles, and/or stomach.  Take all of your medication as directed.  If you become dizzy call your Health Care Provider. Resolved Atrial fibrillation is the most common heart rhythm problem.  The condition puts you at risk for has stroke and heart attack  You were started on blood thinners to prevent possible clot and stroke complications.   Monitor for any signs of bleeding and avoid injury while on this medication  If any bleeding persistent and symptomatic stop the medication and notify your doctor immediately.  It helps if you control your blood pressure, not drink more than 1-2 alcohol drinks per day, cut down on caffeine, getting treatment for over active thyroid gland, and get regular exercise  Call your doctor if you feel your heart racing or beating unusually, chest tightness or pain, lightheaded, faint, shortness of breath especially with exercise  It is important to take your heart medication as prescribed  Continue Xarelto Coronary artery disease is a condition where the arteries the supply the heart muscle get clogged with fatty deposits & puts you at risk for a heart attack  Call your doctor if you have any new pain, pressure, or discomfort in the center of your chest, pain, tingling or discomfort in arms, back, neck, jaw, or stomach, shortness of breath, nausea, vomiting, burping or heartburn, sweating, cold and clammy skin, racing or abnormal heartbeat for more than 10 minutes or if they keep coming & going.  Call 911 and do not tr to get to hospital by care  You can help yourself with lifestyle changes (quitting smoking if you smoke), eat lots of fruits & vegetables & low fat dairy products, not a lot of meat & fatty foods, walk or some form of physical activity most days of the week, lose weight if you are overweight  Take your cardiac medication as prescribed to lower cholesterol, to lower blood pressure, aspirin to prevent blood clots, and diabetes control  Make sure to keep appointments with doctor for cardiac follow up care Weigh yourself daily.  If you gain 3lbs in 3 days, or 5lbs in a week call your Health Care Provider.  Do not eat or drink foods containing more than 2000mg of salt (sodium) in your diet every day.  Call your Health Care Provider if you have any swelling or increased swelling in your feet, ankles, and/or stomach.  Take all of your medication as directed.  If you become dizzy call your Health Care Provider.  ICD interrogation unremarkable.  Weigh yourself daily.  If you gain 3lbs in 3 days, or 5lbs in a week call your Health Care Provider.  Do not eat or drink foods containing more than 2000mg of salt (sodium) in your diet every day.  Call your Health Care Provider if you have any swelling or increased swelling in your feet, ankles, and/or stomach.  Take all of your medication as directed.  If you become dizzy call your Health Care Provider. resolved rate and rhythm control risk modification

## 2019-01-30 NOTE — DIETITIAN INITIAL EVALUATION ADULT. - NS AS NUTRI INTERV ED CONTENT
Nutrition relationship to health/disease/Recommended modifications/Provided pt with heart healthy, consistent carbohydrate diet education (written and verbal). Discussed carbohydrate counting, low sodium food sources, portion control strategies, MyPlate guidelines, food shopping tips, importance of consistency of meals and checking blood glucose around meal times, food shopping and cooking tips. Pt expressed comprehension of education and taught back information./Purpose of the nutrition education

## 2019-01-30 NOTE — PROGRESS NOTE ADULT - PROBLEM SELECTOR PLAN 1
- CXR showing pulm edema on admission.   - on Lasix 40mg IV qday. improved weight from 178 lb to 72lb   - on room air. breathing better per pt.   - TTE showing ?comparable/grossly unchanged EF 26%, bivHF, mod MR  - cont coreg   - NICOLE under consideration to evaluate for occult severe MR if pt agreeable.   Pt and the son wants to do it as outpt with her cardiologist Dr. Avtar Patel   - appreciate cardiology f/u.  - d/c planning home if no further work up from cardiology  (takes Lasix 40 mg PO qdaily at home). dc Lasix dose per cardiology.

## 2019-01-30 NOTE — DISCHARGE NOTE ADULT - HOSPITAL COURSE
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  CXR showing pulm edema on admission. Admitted with acute systolic CHF exacerbation. Treated with Lasix 40mg IV Daily. TTE showing ?comparable/grossly unchanged EF 26%, bivHF, mod MR  - NICOLE under consideration to evaluate for occult severe MR if pt agreeable.   Cardiology had a long conversation with the patients son Stoney [phone], given her recurrent systolic heart failure exacerbations despite optimal medical management, would need to assess for occult severe MR and progression of coronary disease.  Pt and the son understands and agrees but would like to pursue this with Dr. Patel at [place] where all her doctors are. Her next appointment with Dr. Patel is next Friday  He understands that she is at risk for recurrent CHF / respiratory failure  and is willing to accept this risk to pursue an outpatient evaluation ASAP  Clinically improving anticipate can transition to PO Lasix in next 24 hrs and resume Entresto. Weight improved from 178 lb to 72lb, breathing better reported by patient on room air.  (takes Lasix 40 mg PO qdaily at home). 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  CXR showing pulm edema on admission. Admitted with acute systolic CHF exacerbation. Treated with Lasix 40mg IV Daily. TTE showing ?comparable/grossly unchanged EF 26%, bivHF, mod MR  - NICOLE under consideration to evaluate for occult severe MR if pt agreeable. - wants to follow up with out patient cardiologist  Cardiology had a long conversation with the patients son Stoney [phone], given her recurrent systolic heart failure exacerbations despite optimal medical management, would need to assess for occult severe MR and progression of coronary disease.  Pt and the son understands and agrees but would like to pursue this with Dr. Patel at [place] where all her doctors are. Her next appointment with Dr. Patel is next Friday  He understands that she is at risk for recurrent CHF / respiratory failure  and is willing to accept this risk to pursue an outpatient evaluation ASAP  Clinically improving anticipate can transition to PO Lasix in next 24 hrs and resume Entresto. Weight improved from 178 lb to 72lb, breathing better reported by patient on room air.  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  CXR showing pulm edema on admission. Admitted with acute systolic CHF exacerbation. Treated with Lasix 40mg IV Daily. TTE showing ?comparable/grossly unchanged EF 26%, bivHF, mod MR  - NICOLE under consideration to evaluate for occult severe MR if pt agreeable. - wants to follow up with out patient cardiologist  Cardiology had a long conversation with the patients son Stoney [phone], given her recurrent systolic heart failure exacerbations despite optimal medical management, would need to assess for occult severe MR and progression of coronary disease.  Pt and the son understands and agrees but would like to pursue this with Dr. Patel at [place] where all her doctors are. Her next appointment with Dr. Patel is next Friday  He understands that she is at risk for recurrent CHF / respiratory failure  and is willing to accept this risk to pursue an outpatient evaluation ASAP  Clinically improving anticipate can transition to PO Lasix in next 24 hrs and resume Entresto. Weight improved from 178 lb to 72lb, breathing better reported by patient on room air.  (takes Lasix 40 mg PO qdaily at home) - discharge with outpatient cardiologist follow up (has appointment tomorrow 2/1/19) .

## 2019-01-30 NOTE — PROGRESS NOTE ADULT - SUBJECTIVE AND OBJECTIVE BOX
pt seen and examined, no events overnight   SOB on excertion , 3 pillow orthopnea     amLODIPine   Tablet 5 milliGRAM(s) Oral daily  aspirin enteric coated 81 milliGRAM(s) Oral daily  buDESOnide  80 MICROgram(s)/formoterol 4.5 MICROgram(s) Inhaler 2 Puff(s) Inhalation two times a day  carvedilol 25 milliGRAM(s) Oral every 12 hours  dextrose 40% Gel 15 Gram(s) Oral once PRN  dextrose 5%. 1000 milliLiter(s) IV Continuous <Continuous>  dextrose 50% Injectable 12.5 Gram(s) IV Push once  dextrose 50% Injectable 25 Gram(s) IV Push once  dextrose 50% Injectable 25 Gram(s) IV Push once  famotidine    Tablet 20 milliGRAM(s) Oral two times a day  furosemide   Injectable 40 milliGRAM(s) IV Push daily  glucagon  Injectable 1 milliGRAM(s) IntraMuscular once PRN  insulin lispro (HumaLOG) corrective regimen sliding scale   SubCutaneous three times a day before meals  insulin lispro (HumaLOG) corrective regimen sliding scale   SubCutaneous at bedtime  levothyroxine 112 MICROGram(s) Oral daily  pantoprazole    Tablet 40 milliGRAM(s) Oral before breakfast  rivaroxaban 20 milliGRAM(s) Oral every 24 hours  sacubitril 49 mG/valsartan 51 mG 1 Tablet(s) Oral two times a day                            13.3   3.89  )-----------( 133      ( 29 Jan 2019 08:01 )             42.1       Hemoglobin: 13.3 g/dL (01-29 @ 08:01)  Hemoglobin: 13.1 g/dL (01-28 @ 08:28)  Hemoglobin: 13.1 g/dL (01-27 @ 08:15)  Hemoglobin: 13.0 g/dL (01-26 @ 09:08)  Hemoglobin: 13.6 g/dL (01-25 @ 07:40)      01-29    141  |  106  |  25<H>  ----------------------------<  126<H>  4.0   |  26  |  0.92    Ca    9.3      29 Jan 2019 05:44  Mg     2.0     01-29      Creatinine Trend: 0.92<--, 0.84<--, 0.95<--, 0.97<--, 0.99<--, 0.82<--    COAGS:           T(C): 36.4 (01-30-19 @ 03:54), Max: 36.5 (01-29-19 @ 11:46)  HR: 62 (01-30-19 @ 03:54) (62 - 64)  BP: 146/76 (01-30-19 @ 03:54) (107/69 - 146/76)  RR: 18 (01-30-19 @ 03:54) (18 - 18)  SpO2: 100% (01-30-19 @ 03:54) (99% - 100%)  Wt(kg): --    I&O's Summary    28 Jan 2019 07:01  -  29 Jan 2019 07:00  --------------------------------------------------------  IN: 660 mL / OUT: 0 mL / NET: 660 mL    29 Jan 2019 07:01  -  30 Jan 2019 05:10  --------------------------------------------------------  IN: 720 mL / OUT: 750 mL / NET: -30 mL        Gen: Appears well in NAD  HEENT:  (-)icterus (-)pallor  CV: N S1 S2 1/6 ERICK (+)2 Pulses B/l  Resp:  Clear to ausculatation B/L, normal effort  GI: (+) BS Soft, NT, ND  Lymph:  (-)Edema, (-)obvious lymphadenopathy  Skin: Warm to touch, Normal turgor  Psych: Appropriate mood and affect        TELEMETRY: 	 Sinus     ECG:  	sinus 85 BPM, LVH, IVCD    RADIOLOGY:         CXR:  Mild pulmonary edema    AICD interrogation :   1. Battery longevity 5.0-6.6 years  2. Measured data WNL  3. Sensing and pacing thresholds adequate  4. Not PM dependent, Vpaced <1%  5. No ventricular arrhythmia episodes noted  6. Normal ICD function  7. No programming changes done      ECHO : < from: Transthoracic Echocardiogram (01.24.19 @ 14:51) >  onclusions:  1. Tethered mitral valve leaflets with normal opening.  Moderate mitral regurgitation.  2. Calcified trileaflet aortic valve with normal opening.  3. Severely dilated left atrium.  LA volume index = 55  cc/m2.  4. Moderate left ventricular enlargement.  5. Severe global left ventricular systolic dysfunction.  6. Moderate right atrial enlargement. A device wire is  noted in the right heart.  7. Normal right ventricular size with decreased right  ventricular systolic function.  8. Normal tricuspid valve. Mild-moderate tricuspid  regurgitation.  9. Estimated pulmonary artery systolic pressure equals 52  mm Hg, assuming right atrial pressure equals 8 mm Hg,  consistent with moderate pulmonary pressures.    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    pt with JVD, cont aggressive IV diuresis   Daily weights , SOB improving per pt    GI / DVT prophylaxis,  keep K>4, mag >2.0   ECHO 1/24 noted  , will need to have a NICOLE for better evaluation or MR   AICD interrogation noted    BB - BP stable   A/C with xarelto for Afib   D/W Dr Lino    pager [phone]

## 2019-01-30 NOTE — DISCHARGE NOTE ADULT - CONDITION (STATED IN TERMS THAT PERMIT A SPECIFIC MEASURABLE COMPARISON WITH CONDITION ON ADMISSION):
vital signs stable, afebrile, offs no acute complaints, Medically cleared for discharge by Dr Dawson

## 2019-01-30 NOTE — DISCHARGE NOTE ADULT - CARE PROVIDER_API CALL
Avtar Patel)  Cardiology; Internal Medicine  [address] [place] [address]  Phone: [phone]  Fax: [phone] Avtar Patel)  Cardiology; Internal Medicine  [address] [place] [address]  Phone: [phone]  Fax: [phone]    Demond Esqueda)  Internal Medicine  [address] [place] [address], [address] [address]  Phone: [phone]  Fax: [phone] Avtar Patel (MD)  Cardiology; Internal Medicine  [address] [place] [address]  Phone: [phone]  Fax: [phone]    Demond Esqueda (MD)  Internal Medicine  [address] [facility], [facility] [address]  [place] [address]  Phone: [phone]  Fax: [phone]    Bob Lino (MD)  Cardiovascular Disease  [address] [facility] [address]  [place] [address]  Phone: [phone]  Fax: [phone]    Patrick Dawson ()  Internal Medicine  [address] [place] [address]  Phone: [phone]  Fax: [phone]

## 2019-01-30 NOTE — DISCHARGE NOTE ADULT - CARE PLAN
Principal Discharge DX:	Acute combined systolic and diastolic congestive heart failure  Goal:	Remain free of recurrence  Assessment and plan of treatment:	ICD interrogation unremarkable.  Weigh yourself daily.  If you gain 3lbs in 3 days, or 5lbs in a week call your Health Care Provider.  Do not eat or drink foods containing more than 2000mg of salt (sodium) in your diet every day.  Call your Health Care Provider if you have any swelling or increased swelling in your feet, ankles, and/or stomach.  Take all of your medication as directed.  If you become dizzy call your Health Care Provider.  Secondary Diagnosis:	Chest pain, atypical  Assessment and plan of treatment:	Resolved  Secondary Diagnosis:	Atrial fibrillation, unspecified type  Assessment and plan of treatment:	Atrial fibrillation is the most common heart rhythm problem.  The condition puts you at risk for has stroke and heart attack  You were started on blood thinners to prevent possible clot and stroke complications.   Monitor for any signs of bleeding and avoid injury while on this medication  If any bleeding persistent and symptomatic stop the medication and notify your doctor immediately.  It helps if you control your blood pressure, not drink more than 1-2 alcohol drinks per day, cut down on caffeine, getting treatment for over active thyroid gland, and get regular exercise  Call your doctor if you feel your heart racing or beating unusually, chest tightness or pain, lightheaded, faint, shortness of breath especially with exercise  It is important to take your heart medication as prescribed  Continue Xarelto  Secondary Diagnosis:	CAD (coronary artery disease)  Assessment and plan of treatment:	Coronary artery disease is a condition where the arteries the supply the heart muscle get clogged with fatty deposits & puts you at risk for a heart attack  Call your doctor if you have any new pain, pressure, or discomfort in the center of your chest, pain, tingling or discomfort in arms, back, neck, jaw, or stomach, shortness of breath, nausea, vomiting, burping or heartburn, sweating, cold and clammy skin, racing or abnormal heartbeat for more than 10 minutes or if they keep coming & going.  Call 911 and do not tr to get to hospital by care  You can help yourself with lifestyle changes (quitting smoking if you smoke), eat lots of fruits & vegetables & low fat dairy products, not a lot of meat & fatty foods, walk or some form of physical activity most days of the week, lose weight if you are overweight  Take your cardiac medication as prescribed to lower cholesterol, to lower blood pressure, aspirin to prevent blood clots, and diabetes control  Make sure to keep appointments with doctor for cardiac follow up care Principal Discharge DX:	Acute combined systolic and diastolic congestive heart failure  Goal:	Remain free of recurrence  Assessment and plan of treatment:	Weigh yourself daily.  If you gain 3lbs in 3 days, or 5lbs in a week call your Health Care Provider.  Do not eat or drink foods containing more than 2000mg of salt (sodium) in your diet every day.  Call your Health Care Provider if you have any swelling or increased swelling in your feet, ankles, and/or stomach.  Take all of your medication as directed.  If you become dizzy call your Health Care Provider.  ICD interrogation unremarkable.  Weigh yourself daily.  If you gain 3lbs in 3 days, or 5lbs in a week call your Health Care Provider.  Do not eat or drink foods containing more than 2000mg of salt (sodium) in your diet every day.  Call your Health Care Provider if you have any swelling or increased swelling in your feet, ankles, and/or stomach.  Take all of your medication as directed.  If you become dizzy call your Health Care Provider.  Secondary Diagnosis:	Chest pain, atypical  Goal:	resolved  Assessment and plan of treatment:	Resolved  Secondary Diagnosis:	Atrial fibrillation, unspecified type  Goal:	rate and rhythm control  Assessment and plan of treatment:	Atrial fibrillation is the most common heart rhythm problem.  The condition puts you at risk for has stroke and heart attack  You were started on blood thinners to prevent possible clot and stroke complications.   Monitor for any signs of bleeding and avoid injury while on this medication  If any bleeding persistent and symptomatic stop the medication and notify your doctor immediately.  It helps if you control your blood pressure, not drink more than 1-2 alcohol drinks per day, cut down on caffeine, getting treatment for over active thyroid gland, and get regular exercise  Call your doctor if you feel your heart racing or beating unusually, chest tightness or pain, lightheaded, faint, shortness of breath especially with exercise  It is important to take your heart medication as prescribed  Continue Xarelto  Secondary Diagnosis:	CAD (coronary artery disease)  Goal:	risk modification  Assessment and plan of treatment:	Coronary artery disease is a condition where the arteries the supply the heart muscle get clogged with fatty deposits & puts you at risk for a heart attack  Call your doctor if you have any new pain, pressure, or discomfort in the center of your chest, pain, tingling or discomfort in arms, back, neck, jaw, or stomach, shortness of breath, nausea, vomiting, burping or heartburn, sweating, cold and clammy skin, racing or abnormal heartbeat for more than 10 minutes or if they keep coming & going.  Call 911 and do not tr to get to hospital by care  You can help yourself with lifestyle changes (quitting smoking if you smoke), eat lots of fruits & vegetables & low fat dairy products, not a lot of meat & fatty foods, walk or some form of physical activity most days of the week, lose weight if you are overweight  Take your cardiac medication as prescribed to lower cholesterol, to lower blood pressure, aspirin to prevent blood clots, and diabetes control  Make sure to keep appointments with doctor for cardiac follow up care

## 2019-01-30 NOTE — PROGRESS NOTE ADULT - ASSESSMENT
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

## 2019-01-30 NOTE — DIETITIAN INITIAL EVALUATION ADULT. - PROBLEM SELECTOR PLAN 2
- pepcid, protonix  - AMEE as above  - may need stress test inpt. [facility] to call their cardiologists.

## 2019-01-30 NOTE — DIETITIAN INITIAL EVALUATION ADULT. - ENERGY NEEDS
Ht: 5'3", .6 lb,  lb (+/- 10%), IBW% 150%, BMI 30.6  IBW used as pt >120% IBW  1/28/19 wt: 173.5 lb, Admission wt: 177.4 lb. Downward trend in wt likely secondary to fluid, pt being diuresed.  Fluids per team as pt with CHF

## 2019-01-31 VITALS
SYSTOLIC BLOOD PRESSURE: 136 MMHG | HEART RATE: 77 BPM | TEMPERATURE: 98 F | RESPIRATION RATE: 17 BRPM | DIASTOLIC BLOOD PRESSURE: 76 MMHG | OXYGEN SATURATION: 100 %

## 2019-01-31 LAB
ANION GAP SERPL CALC-SCNC: 10 MMOL/L — SIGNIFICANT CHANGE UP (ref 5–17)
BUN SERPL-MCNC: 20 MG/DL — SIGNIFICANT CHANGE UP (ref 7–23)
CALCIUM SERPL-MCNC: 9.5 MG/DL — SIGNIFICANT CHANGE UP (ref 8.4–10.5)
CHLORIDE SERPL-SCNC: 108 MMOL/L — SIGNIFICANT CHANGE UP (ref 96–108)
CO2 SERPL-SCNC: 25 MMOL/L — SIGNIFICANT CHANGE UP (ref 22–31)
CREAT SERPL-MCNC: 0.86 MG/DL — SIGNIFICANT CHANGE UP (ref 0.5–1.3)
GLUCOSE BLDC GLUCOMTR-MCNC: 151 MG/DL — HIGH (ref 70–99)
GLUCOSE BLDC GLUCOMTR-MCNC: 211 MG/DL — HIGH (ref 70–99)
GLUCOSE SERPL-MCNC: 182 MG/DL — HIGH (ref 70–99)
HCT VFR BLD CALC: 45.3 % — HIGH (ref 34.5–45)
HGB BLD-MCNC: 14.6 G/DL — SIGNIFICANT CHANGE UP (ref 11.5–15.5)
MAGNESIUM SERPL-MCNC: 2 MG/DL — SIGNIFICANT CHANGE UP (ref 1.6–2.6)
MCHC RBC-ENTMCNC: 25.4 PG — LOW (ref 27–34)
MCHC RBC-ENTMCNC: 32.3 GM/DL — SIGNIFICANT CHANGE UP (ref 32–36)
MCV RBC AUTO: 78.8 FL — LOW (ref 80–100)
PLATELET # BLD AUTO: 123 K/UL — LOW (ref 150–400)
POTASSIUM SERPL-MCNC: 3.7 MMOL/L — SIGNIFICANT CHANGE UP (ref 3.5–5.3)
POTASSIUM SERPL-SCNC: 3.7 MMOL/L — SIGNIFICANT CHANGE UP (ref 3.5–5.3)
RBC # BLD: 5.74 M/UL — HIGH (ref 3.8–5.2)
RBC # FLD: 13.5 % — SIGNIFICANT CHANGE UP (ref 10.3–14.5)
SODIUM SERPL-SCNC: 143 MMOL/L — SIGNIFICANT CHANGE UP (ref 135–145)
WBC # BLD: 4.4 K/UL — SIGNIFICANT CHANGE UP (ref 3.8–10.5)
WBC # FLD AUTO: 4.4 K/UL — SIGNIFICANT CHANGE UP (ref 3.8–10.5)

## 2019-01-31 PROCEDURE — 82962 GLUCOSE BLOOD TEST: CPT

## 2019-01-31 PROCEDURE — 87633 RESP VIRUS 12-25 TARGETS: CPT

## 2019-01-31 PROCEDURE — 87486 CHLMYD PNEUM DNA AMP PROBE: CPT

## 2019-01-31 PROCEDURE — 80061 LIPID PANEL: CPT

## 2019-01-31 PROCEDURE — 85610 PROTHROMBIN TIME: CPT

## 2019-01-31 PROCEDURE — 99285 EMERGENCY DEPT VISIT HI MDM: CPT | Mod: 25

## 2019-01-31 PROCEDURE — 94640 AIRWAY INHALATION TREATMENT: CPT

## 2019-01-31 PROCEDURE — 71045 X-RAY EXAM CHEST 1 VIEW: CPT

## 2019-01-31 PROCEDURE — 93306 TTE W/DOPPLER COMPLETE: CPT

## 2019-01-31 PROCEDURE — 96374 THER/PROPH/DIAG INJ IV PUSH: CPT

## 2019-01-31 PROCEDURE — 80048 BASIC METABOLIC PNL TOTAL CA: CPT

## 2019-01-31 PROCEDURE — 83880 ASSAY OF NATRIURETIC PEPTIDE: CPT

## 2019-01-31 PROCEDURE — 82553 CREATINE MB FRACTION: CPT

## 2019-01-31 PROCEDURE — 96372 THER/PROPH/DIAG INJ SC/IM: CPT | Mod: XU

## 2019-01-31 PROCEDURE — 84443 ASSAY THYROID STIM HORMONE: CPT

## 2019-01-31 PROCEDURE — 84132 ASSAY OF SERUM POTASSIUM: CPT

## 2019-01-31 PROCEDURE — 82550 ASSAY OF CK (CPK): CPT

## 2019-01-31 PROCEDURE — 87798 DETECT AGENT NOS DNA AMP: CPT

## 2019-01-31 PROCEDURE — 97161 PT EVAL LOW COMPLEX 20 MIN: CPT

## 2019-01-31 PROCEDURE — 85014 HEMATOCRIT: CPT

## 2019-01-31 PROCEDURE — 83036 HEMOGLOBIN GLYCOSYLATED A1C: CPT

## 2019-01-31 PROCEDURE — 93005 ELECTROCARDIOGRAM TRACING: CPT

## 2019-01-31 PROCEDURE — 85027 COMPLETE CBC AUTOMATED: CPT

## 2019-01-31 PROCEDURE — 82803 BLOOD GASES ANY COMBINATION: CPT

## 2019-01-31 PROCEDURE — 82330 ASSAY OF CALCIUM: CPT

## 2019-01-31 PROCEDURE — 84484 ASSAY OF TROPONIN QUANT: CPT

## 2019-01-31 PROCEDURE — 82947 ASSAY GLUCOSE BLOOD QUANT: CPT

## 2019-01-31 PROCEDURE — 84295 ASSAY OF SERUM SODIUM: CPT

## 2019-01-31 PROCEDURE — 82435 ASSAY OF BLOOD CHLORIDE: CPT

## 2019-01-31 PROCEDURE — 80053 COMPREHEN METABOLIC PANEL: CPT

## 2019-01-31 PROCEDURE — G0378: CPT

## 2019-01-31 PROCEDURE — 84100 ASSAY OF PHOSPHORUS: CPT

## 2019-01-31 PROCEDURE — 85730 THROMBOPLASTIN TIME PARTIAL: CPT

## 2019-01-31 PROCEDURE — 83735 ASSAY OF MAGNESIUM: CPT

## 2019-01-31 PROCEDURE — 83605 ASSAY OF LACTIC ACID: CPT

## 2019-01-31 PROCEDURE — 87581 M.PNEUMON DNA AMP PROBE: CPT

## 2019-01-31 PROCEDURE — 86803 HEPATITIS C AB TEST: CPT

## 2019-01-31 RX ORDER — BUDESONIDE AND FORMOTEROL FUMARATE DIHYDRATE 160; 4.5 UG/1; UG/1
2 AEROSOL RESPIRATORY (INHALATION)
Qty: 1 | Refills: 0
Start: 2019-01-31 | End: 2019-03-01

## 2019-01-31 RX ADMIN — SACUBITRIL AND VALSARTAN 1 TABLET(S): 24; 26 TABLET, FILM COATED ORAL at 05:24

## 2019-01-31 RX ADMIN — Medication 81 MILLIGRAM(S): at 12:10

## 2019-01-31 RX ADMIN — AMLODIPINE BESYLATE 5 MILLIGRAM(S): 2.5 TABLET ORAL at 05:25

## 2019-01-31 RX ADMIN — Medication 2: at 12:27

## 2019-01-31 RX ADMIN — BUDESONIDE AND FORMOTEROL FUMARATE DIHYDRATE 2 PUFF(S): 160; 4.5 AEROSOL RESPIRATORY (INHALATION) at 05:25

## 2019-01-31 RX ADMIN — Medication 112 MICROGRAM(S): at 05:25

## 2019-01-31 RX ADMIN — PANTOPRAZOLE SODIUM 40 MILLIGRAM(S): 20 TABLET, DELAYED RELEASE ORAL at 05:25

## 2019-01-31 RX ADMIN — Medication 1: at 08:19

## 2019-01-31 RX ADMIN — CARVEDILOL PHOSPHATE 25 MILLIGRAM(S): 80 CAPSULE, EXTENDED RELEASE ORAL at 05:25

## 2019-01-31 NOTE — PROGRESS NOTE ADULT - SUBJECTIVE AND OBJECTIVE BOX
pt seen and examined    amLODIPine   Tablet 5 milliGRAM(s) Oral daily  aspirin enteric coated 81 milliGRAM(s) Oral daily  buDESOnide  80 MICROgram(s)/formoterol 4.5 MICROgram(s) Inhaler 2 Puff(s) Inhalation two times a day  carvedilol 25 milliGRAM(s) Oral every 12 hours  dextrose 40% Gel 15 Gram(s) Oral once PRN  dextrose 5%. 1000 milliLiter(s) IV Continuous <Continuous>  dextrose 50% Injectable 12.5 Gram(s) IV Push once  dextrose 50% Injectable 25 Gram(s) IV Push once  dextrose 50% Injectable 25 Gram(s) IV Push once  famotidine    Tablet 20 milliGRAM(s) Oral two times a day  furosemide   Injectable 40 milliGRAM(s) IV Push daily  glucagon  Injectable 1 milliGRAM(s) IntraMuscular once PRN  insulin lispro (HumaLOG) corrective regimen sliding scale   SubCutaneous three times a day before meals  insulin lispro (HumaLOG) corrective regimen sliding scale   SubCutaneous at bedtime  levothyroxine 112 MICROGram(s) Oral daily  pantoprazole    Tablet 40 milliGRAM(s) Oral before breakfast  rivaroxaban 20 milliGRAM(s) Oral every 24 hours  sacubitril 49 mG/valsartan 51 mG 1 Tablet(s) Oral two times a day                            14.9   3.78  )-----------( 126      ( 30 Jan 2019 08:09 )             45.6       Hemoglobin: 14.9 g/dL (01-30 @ 08:09)  Hemoglobin: 13.3 g/dL (01-29 @ 08:01)  Hemoglobin: 13.1 g/dL (01-28 @ 08:28)  Hemoglobin: 13.1 g/dL (01-27 @ 08:15)  Hemoglobin: 13.0 g/dL (01-26 @ 09:08)      01-31    143  |  108  |  20  ----------------------------<  182<H>  3.7   |  25  |  0.86    Ca    9.5      31 Jan 2019 06:45  Mg     2.0     01-31      Creatinine Trend: 0.86<--, 0.89<--, 0.92<--, 0.84<--, 0.95<--, 0.97<--    COAGS:           T(C): 36.4 (01-31-19 @ 04:02), Max: 36.5 (01-30-19 @ 12:25)  HR: 66 (01-31-19 @ 04:02) (64 - 68)  BP: 149/80 (01-31-19 @ 04:02) (120/73 - 149/80)  RR: 18 (01-31-19 @ 04:02) (18 - 18)  SpO2: 100% (01-31-19 @ 04:02) (100% - 100%)  Wt(kg): --    I&O's Summary    30 Jan 2019 07:01  -  31 Jan 2019 07:00  --------------------------------------------------------  IN: 840 mL / OUT: 0 mL / NET: 840 mL        Gen: Appears well in NAD  HEENT:  (-)icterus (-)pallor  CV: N S1 S2 1/6 ERICK (+)2 Pulses B/l  Resp:  Clear to ausculatation B/L, normal effort  GI: (+) BS Soft, NT, ND  Lymph:  (-)Edema, (-)obvious lymphadenopathy  Skin: Warm to touch, Normal turgor  Psych: Appropriate mood and affect        TELEMETRY: 	 Sinus     ECG:  	sinus 85 BPM, LVH, IVCD    RADIOLOGY:         CXR:  Mild pulmonary edema    AICD interrogation :   1. Battery longevity 5.0-6.6 years  2. Measured data WNL  3. Sensing and pacing thresholds adequate  4. Not PM dependent, Vpaced <1%  5. No ventricular arrhythmia episodes noted  6. Normal ICD function  7. No programming changes done      ECHO : < from: Transthoracic Echocardiogram (01.24.19 @ 14:51) >  onclusions:  1. Tethered mitral valve leaflets with normal opening.  Moderate mitral regurgitation.  2. Calcified trileaflet aortic valve with normal opening.  3. Severely dilated left atrium.  LA volume index = 55  cc/m2.  4. Moderate left ventricular enlargement.  5. Severe global left ventricular systolic dysfunction.  6. Moderate right atrial enlargement. A device wire is  noted in the right heart.  7. Normal right ventricular size with decreased right  ventricular systolic function.  8. Normal tricuspid valve. Mild-moderate tricuspid  regurgitation.  9. Estimated pulmonary artery systolic pressure equals 52  mm Hg, assuming right atrial pressure equals 8 mm Hg,  consistent with moderate pulmonary pressures.    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    pt to be d/c home on PO Lasix   cont Entresto    GI / DVT prophylaxis,  keep K>4, mag >2.0   ECHO 1/24 noted  , will need to have a NICOLE for better evaluation or MR   AICD interrogation noted    BB - BP stable   A/C with xarelto for Afib   pt has an appt with primary Cards appt on friday , for follow up on MR treatement   D/W Dr Lino    pager [phone]

## 2019-01-31 NOTE — CHART NOTE - NSCHARTNOTEFT_GEN_A_CORE
feels well.  walking around in her room. on room air.  no cp, no sob, no n/v/d. no abdominal pain.  no headache, no dizziness.   d/c planning home today.  card f/u with Dr. Avtar Patel.     - Dr. JENNIFER Dawson ([facility])  - [phone]

## 2019-01-31 NOTE — PROGRESS NOTE ADULT - ATTENDING COMMENTS
Agree with above assessment and plan as outlined above.    - D/C planning today following up with Jorge Yip tomorrow    Bob Lino MD, [facility]  BEEPER [phone]
Agree with above assessment and plan as outlined above.    - f/u echo    [facility]ino [facility], [facility]  BEEPER [phone]
Agree with above assessment and plan as outlined above.    - obtain prior records  - echo noted    Bob Lino MD, [facility]  BEEPER [phone]
Patient seen and examined, agree with above assessment and plan as transcribed above.    - Clinically improving anticipate can transition to PO lasix in next 24 hrs and restart entresto  - I had a long conversation with the patients son Stoney [phone], given her recurrent systolic heart failure exacerbations desoite optimal medical management we would need to assess for occult severe MR and progression of coronary disease.  HE understands and agrees but would like to pursue this with Dr. Patel at [facility] where all her Dr. Are  - She has an appointment on Friday with Dr. Patel  - HE understands that she is at risk for recurrent CHF / respiratory failure  and is willing to accept this risk to pursue an outpatient evaluation ASAP    Bob Lino MD, [facility]  BEEPER [phone]
Patient seen and examined, agree with above assessment and plan as transcribed above.    - still with SOB and PND ? Occult Severe MR  - Plan for NICOLE if patient bhanu Lino MD, [facility]  BEEPER [phone]
- Dr. JENNIFER Dawson ([facility])  - [phone]

## 2019-01-31 NOTE — PROGRESS NOTE ADULT - REASON FOR ADMISSION
SOB, coughing

## 2019-03-07 ENCOUNTER — LABORATORY RESULT (OUTPATIENT)
Age: 69
End: 2019-03-07

## 2019-03-07 ENCOUNTER — APPOINTMENT (OUTPATIENT)
Dept: ENDOCRINOLOGY | Facility: CLINIC | Age: 69
End: 2019-03-07
Payer: MEDICARE

## 2019-03-07 VITALS — HEART RATE: 60 BPM | OXYGEN SATURATION: 93 %

## 2019-03-07 VITALS
HEIGHT: 61.9 IN | BODY MASS INDEX: 31.94 KG/M2 | DIASTOLIC BLOOD PRESSURE: 80 MMHG | SYSTOLIC BLOOD PRESSURE: 120 MMHG | WEIGHT: 173.6 LBS

## 2019-03-07 DIAGNOSIS — E03.9 HYPOTHYROIDISM, UNSPECIFIED: ICD-10-CM

## 2019-03-07 LAB
GLUCOSE BLDC GLUCOMTR-MCNC: 68
HBA1C MFR BLD HPLC: 7.6

## 2019-03-07 PROCEDURE — 83036 HEMOGLOBIN GLYCOSYLATED A1C: CPT | Mod: QW

## 2019-03-07 PROCEDURE — 82962 GLUCOSE BLOOD TEST: CPT

## 2019-03-07 PROCEDURE — 99214 OFFICE O/P EST MOD 30 MIN: CPT

## 2019-03-08 PROBLEM — E03.9 HYPOTHYROIDISM: Status: ACTIVE | Noted: 2019-03-08

## 2019-03-10 LAB
25(OH)D3 SERPL-MCNC: 39.3 NG/ML
ALBUMIN SERPL ELPH-MCNC: 4.3 G/DL
ALP BLD-CCNC: 81 U/L
ALT SERPL-CCNC: 13 U/L
ANION GAP SERPL CALC-SCNC: 15 MMOL/L
APPEARANCE: ABNORMAL
AST SERPL-CCNC: 21 U/L
BACTERIA: NEGATIVE
BASOPHILS # BLD AUTO: 0.02 K/UL
BASOPHILS NFR BLD AUTO: 0.5 %
BILIRUB SERPL-MCNC: 0.5 MG/DL
BILIRUBIN URINE: NEGATIVE
BLOOD URINE: NEGATIVE
BUN SERPL-MCNC: 23 MG/DL
CALCIUM OXALATE CRYSTALS: ABNORMAL
CALCIUM SERPL-MCNC: 9.4 MG/DL
CHLORIDE SERPL-SCNC: 106 MMOL/L
CHOLEST SERPL-MCNC: 134 MG/DL
CHOLEST/HDLC SERPL: 2.4 RATIO
CO2 SERPL-SCNC: 25 MMOL/L
COLOR: YELLOW
CREAT SERPL-MCNC: 1.02 MG/DL
CREAT SPEC-SCNC: 86 MG/DL
EOSINOPHIL # BLD AUTO: 0.07 K/UL
EOSINOPHIL NFR BLD AUTO: 1.8 %
GLUCOSE QUALITATIVE U: NEGATIVE
GLUCOSE SERPL-MCNC: 163 MG/DL
GRANULAR CASTS: 0 /LPF
HCT VFR BLD CALC: 44.8 %
HDLC SERPL-MCNC: 56 MG/DL
HGB BLD-MCNC: 13.6 G/DL
HYALINE CASTS: 0 /LPF
IMM GRANULOCYTES NFR BLD AUTO: 0.3 %
KETONES URINE: NEGATIVE
LDLC SERPL CALC-MCNC: 68 MG/DL
LEUKOCYTE ESTERASE URINE: NEGATIVE
LYMPHOCYTES # BLD AUTO: 0.97 K/UL
LYMPHOCYTES NFR BLD AUTO: 25 %
MAN DIFF?: NORMAL
MCHC RBC-ENTMCNC: 24.9 PG
MCHC RBC-ENTMCNC: 30.4 GM/DL
MCV RBC AUTO: 81.9 FL
MICROALBUMIN 24H UR DL<=1MG/L-MCNC: 9 MG/DL
MICROALBUMIN/CREAT 24H UR-RTO: 105 MG/G
MICROSCOPIC-UA: NORMAL
MONOCYTES # BLD AUTO: 0.37 K/UL
MONOCYTES NFR BLD AUTO: 9.5 %
NEUTROPHILS # BLD AUTO: 2.44 K/UL
NEUTROPHILS NFR BLD AUTO: 62.9 %
NITRITE URINE: NEGATIVE
PH URINE: 5.5
PLATELET # BLD AUTO: 88 K/UL
POTASSIUM SERPL-SCNC: 3.8 MMOL/L
PROT SERPL-MCNC: 7.2 G/DL
PROTEIN URINE: NORMAL
RBC # BLD: 5.47 M/UL
RBC # FLD: 15.7 %
RED BLOOD CELLS URINE: 2 /HPF
SODIUM SERPL-SCNC: 146 MMOL/L
SPECIFIC GRAVITY URINE: 1.02
SQUAMOUS EPITHELIAL CELLS: 3 /HPF
TRIGL SERPL-MCNC: 50 MG/DL
TSH SERPL-ACNC: 0.05 UIU/ML
URATE SERPL-MCNC: 6.4 MG/DL
UROBILINOGEN URINE: NORMAL
VIT B12 SERPL-MCNC: 852 PG/ML
WBC # FLD AUTO: 3.88 K/UL
WHITE BLOOD CELLS URINE: 2 /HPF

## 2019-03-10 NOTE — PHYSICAL EXAM
[Alert] : alert [No Acute Distress] : no acute distress [No Respiratory Distress] : no respiratory distress [Normal Rate and Effort] : normal respiratory rhythm and effort [Clear to Auscultation] : lungs were clear to auscultation bilaterally [Normal Rate] : heart rate was normal  [Normal S1, S2] : normal S1 and S2 [Regular Rhythm] : with a regular rhythm [Normal Bowel Sounds] : normal bowel sounds [Not Tender] : non-tender [Soft] : abdomen soft [Not Distended] : not distended [Right Foot Was Examined] : right foot ~C was examined [Left Foot Was Examined] : left foot ~C was examined [Normal] : normal [2+] : 2+ in the dorsalis pedis [Diminished Throughout Both Feet] : normal tactile sensation with monofilament testing throughout both feet

## 2019-03-10 NOTE — ASSESSMENT
[FreeTextEntry1] : 70 yo female with hx T2DM with cva, microalbumin and chf s/p ICD here for f/u and DXA. \par 1) T2DM: FBS at goal, continue prandin 2mg with breakfast AND dinner. \par -check bs qdaily\par -HbA1C at goal between 7 and 8%.\par 2) Hypothyroidism: TSH goal: 0.2-3, Check TSH. Continue Synthroid.\par 3) HTN: continue current regimen, slightly elevated today at 140/66, but seeing her PCP soon so will defer to himt o f/u. \par 4) Dyslipidemia: statin tx: LDL is 71 on crestor 20mg po qhs.\par RTC in 4 months\par CC: Dr. Esqueda and Dr. Patel.

## 2019-03-10 NOTE — HISTORY OF PRESENT ILLNESS
[FreeTextEntry1] : 70 yo female with hx of t2dm controlled (HbA1c 7.6%) x 30years with neuropathy, microalbuminuria, cad and cva here for f/u. Pt takes Prandin 2mg po with b'fast and dinner skips at lunch as she only has a salad.\par Pt saw optho in July will f/u this summer. She was told that her eyes are unchanged. She has R eye deficits from the stroke. She sees someone in [place] but cannot remember the name. No blurred vision/diplopia. She is undergoing a cardiac evaluation, recently had a NICOLE at [facility]. No cp or palpation problems. \par \par Her bs was 69 as she did not eat this am - completely asymptomatic. She did not take any prandin this am. \par BS\par 3/7 89\par 3/6 137\par 3/5 133\par 3/4 86\par 3/3 97\par 3/2 133\par 3/1 109\par 2/28 124\par 2/27 108\par 2/26 124\par 2/25 109\par 2/24 121\par

## 2019-03-10 NOTE — ADDENDUM
[FreeTextEntry1] : 3/10 - left pt a vm that I would like her to hold her Synthroid for the week as her levels are too high then she can resume next Sunday. Spoke with her son Stoney so he could realy the info to her and he notedthat he would see her today after [facility]. I explained to him that her dose will be reduced to 100mcg po qdaily going forward. If she has a lot of her 112mcg tablets left she can take one tablet daily Mon-Sat, hold on Sundays (which is the equivalent of 96mcg po qdaily). She can have her labs rechecked when she see her PCP next month.

## 2019-05-23 NOTE — ED CDU PROVIDER DISPOSITION NOTE - NS ED MD DISPO DIVISION
Refill request received earlier today. Encounter closed.    [facility]

## 2019-08-01 ENCOUNTER — APPOINTMENT (OUTPATIENT)
Dept: ENDOCRINOLOGY | Facility: CLINIC | Age: 69
End: 2019-08-01

## 2019-08-15 ENCOUNTER — RX RENEWAL (OUTPATIENT)
Age: 69
End: 2019-08-15

## 2019-09-11 ENCOUNTER — EMERGENCY (EMERGENCY)
Facility: HOSPITAL | Age: 69
LOS: 1 days | Discharge: HOME | End: 2019-09-11
Attending: STUDENT IN AN ORGANIZED HEALTH CARE EDUCATION/TRAINING PROGRAM
Payer: MEDICARE

## 2019-09-11 VITALS
SYSTOLIC BLOOD PRESSURE: 144 MMHG | WEIGHT: 162.04 LBS | TEMPERATURE: 98 F | OXYGEN SATURATION: 100 % | RESPIRATION RATE: 20 BRPM | HEART RATE: 79 BPM | DIASTOLIC BLOOD PRESSURE: 76 MMHG

## 2019-09-11 VITALS
SYSTOLIC BLOOD PRESSURE: 153 MMHG | TEMPERATURE: 98 F | HEART RATE: 81 BPM | DIASTOLIC BLOOD PRESSURE: 82 MMHG | RESPIRATION RATE: 17 BRPM | OXYGEN SATURATION: 98 %

## 2019-09-11 PROCEDURE — 99283 EMERGENCY DEPT VISIT LOW MDM: CPT

## 2019-09-11 PROCEDURE — 99282 EMERGENCY DEPT VISIT SF MDM: CPT

## 2019-09-11 RX ORDER — LIDOCAINE 4 G/100G
10 CREAM TOPICAL ONCE
Refills: 0 | Status: COMPLETED | OUTPATIENT
Start: 2019-09-11 | End: 2019-09-11

## 2019-09-11 RX ADMIN — LIDOCAINE 10 MILLILITER(S): 4 CREAM TOPICAL at 21:54

## 2019-09-11 NOTE — ED PROVIDER NOTE - NSFOLLOWUPCLINICS_GEN_ALL_ED_FT
[facility] - ENT  Otolaryngology (ENT)  [address] [place] [address]  Phone: [phone]  Fax:   Follow Up Time: 4-6 Days

## 2019-09-11 NOTE — ED PROVIDER NOTE - CLINICAL SUMMARY MEDICAL DECISION MAKING FREE TEXT BOX
Charisse White MD  69 F w/ PMh of HTN, HLD, DM, CAD s/p stents, hx of cardiac arrest in July 2019 w/ ICU stay intubation and oropharyngeal tube, p/w globus sensation. States that symptoms have been persistent since extubation. She states that she was in rehab, she has been ambulating well. She has no chest pain. no shortness of breath. She states that intermittently at night she becomes anxious that she cannot breath. She has no lower extremity edema. Pt denies any headaches. On exam, she has no throat mass, no palpable lymphadenopathy, she has no stridor, She has clear lungs to ascultation. Her respirations are regular.  abdomen is soft. She is here with her son. Pt lives w/ family at home. She reports that she feels comfortable going home at this time.

## 2019-09-11 NOTE — ED PROVIDER NOTE - OBJECTIVE STATEMENT
70yo F phmx of HTN, HLD, DM, CAD s/p stents, CHF s/p AICD, CVA s/p mild left hemiparesis and mild asphasia, recent admission to [place] for heart failure/ MI? was in ICU intubated on respirator for 2 weeks, moved to floor and then to rehab for 30 day stay, discharged 3days ago 9/8/19.  Since discharge patient has been getting episode of generalized weakness, can not further explain.  Family noted she was not taking her Entresto, due to medication confusion after rehab stay, so began re-taking it twice a day yesterday and was feeling much better.  However today after being left alone at home for a few hours began to feel weakness again, family thinks is might be anxiety related as she has not been home alone in a while, has xanax 0.25mg PRN.  Patient also complaining of feeling like something stuck in throat since being extubated, was told in [facility] to work on swallowing exercises and everything is okay.  patient endorses she is able to swallow solid foods without issue.  Denies fever, chills, leg swelling, SOB, CP, dizziness, urinary symptoms, n/v/d, abdominal pain.    Patient has appointment with PCP tomorrow for follow up.

## 2019-09-11 NOTE — ED PROVIDER NOTE - NSFOLLOWUPINSTRUCTIONS_ED_ALL_ED_FT
Please take your medications as prescribed.   Follow up with the Ear Nose Throat Doctor for Nasopharyngeal laryngoscopy.   You were seen in the emergency department today and we recommend that you return if you develop chest pain, abdominal pain, shortness of breath, lightheadedness, vomiting, leg swelling, fever, headache, slurred speech, weakness or blurry vision.     Please follow up with your primary care doctor over the next 2-3 days for further management of your symptoms and to review your bloodwork to ensure no additional tests, imaging or bloodwork, need to be performed. Please take your medications as prescribed.   Follow up with the Ear Nose Throat Doctor for Nasopharyngeal laryngoscopy.   You may need to see a gastroenterologist for a esophagogastroduodenal endoscopy or you may need to be started on a medication called protonix. We gave you viscous lidocaine in the emergency room for your symptoms.   You were seen in the emergency department today and we recommend that you return if you develop chest pain, abdominal pain, shortness of breath, lightheadedness, vomiting, leg swelling, fever, headache, slurred speech, weakness or blurry vision.   You have a follow up with your primary care doctor for further management of your symptoms.

## 2019-09-11 NOTE — ED PROVIDER NOTE - PATIENT PORTAL LINK FT
You can access the FollowMyHealth Patient Portal offered by [facility] by registering at the following website: [email or web address]. By joining [facility]’s FollowMyHealth portal, you will also be able to view your health information using other applications (apps) compatible with our system.

## 2019-09-11 NOTE — ED ADULT NURSE NOTE - OBJECTIVE STATEMENT
68y/o Female presenting to the ED ambulatory, A&Ox3, complaining of throat pain and intermittent weakness. Pt recently hospitalized and had in hospital cardiac arrest, intubated in ICU. Pt extubated and discharged, pt complaining that these symptoms have began since discharge. Pt reports anxiety since discharge. As per MD White, no labs to be drawn. Pt denies chest pain, shortness of breath, abdominal pain, back pain, headache, dizziness, N/V/D, palpitations. Steady gait noted upon arrival to the ED. Pt in no current distress. Safety and comfort measures provided and maintained, bed in lowest position, side rails up for safety.

## 2019-11-21 ENCOUNTER — APPOINTMENT (OUTPATIENT)
Dept: ENDOCRINOLOGY | Facility: CLINIC | Age: 69
End: 2019-11-21
Payer: MEDICARE

## 2019-11-21 ENCOUNTER — MEDICATION RENEWAL (OUTPATIENT)
Age: 69
End: 2019-11-21

## 2019-11-21 VITALS
DIASTOLIC BLOOD PRESSURE: 81 MMHG | SYSTOLIC BLOOD PRESSURE: 123 MMHG | HEART RATE: 63 BPM | BODY MASS INDEX: 30.18 KG/M2 | HEIGHT: 61.9 IN | WEIGHT: 164 LBS | OXYGEN SATURATION: 95 %

## 2019-11-21 DIAGNOSIS — G47.00 INSOMNIA, UNSPECIFIED: ICD-10-CM

## 2019-11-21 LAB
GLUCOSE BLDC GLUCOMTR-MCNC: 184
HBA1C MFR BLD HPLC: 7.6

## 2019-11-21 PROCEDURE — 82962 GLUCOSE BLOOD TEST: CPT

## 2019-11-21 PROCEDURE — 99214 OFFICE O/P EST MOD 30 MIN: CPT | Mod: 25

## 2019-11-21 PROCEDURE — 83036 HEMOGLOBIN GLYCOSYLATED A1C: CPT | Mod: QW

## 2019-11-21 RX ORDER — FLUTICASONE FUROATE AND VILANTEROL TRIFENATATE 100; 25 UG/1; UG/1
100-25 POWDER RESPIRATORY (INHALATION) DAILY
Qty: 1 | Refills: 0 | Status: ACTIVE | COMMUNITY
Start: 2019-11-21

## 2019-11-21 RX ORDER — EPLERENONE 50 MG/1
50 TABLET, COATED ORAL DAILY
Qty: 30 | Refills: 0 | Status: ACTIVE | COMMUNITY
Start: 2019-11-21

## 2019-11-21 RX ORDER — APIXABAN 5 MG/1
5 TABLET, FILM COATED ORAL TWICE DAILY
Qty: 60 | Refills: 0 | Status: ACTIVE | COMMUNITY
Start: 2019-11-21

## 2019-11-21 RX ORDER — BUDESONIDE AND FORMOTEROL FUMARATE DIHYDRATE 160; 4.5 UG/1; UG/1
160-4.5 AEROSOL RESPIRATORY (INHALATION)
Qty: 1 | Refills: 0 | Status: DISCONTINUED | COMMUNITY
Start: 2019-03-07 | End: 2019-11-21

## 2019-11-21 RX ORDER — DIGOXIN 125 UG/1
125 TABLET ORAL DAILY
Qty: 30 | Refills: 0 | Status: ACTIVE | COMMUNITY
Start: 2019-11-21

## 2019-11-22 NOTE — ASSESSMENT
[FreeTextEntry1] : 68 yo female with hx T2DM with cva, microalbumin and chf s/p ICD here for f/u and DXA. \par 1) T2DM: FBS at goal, continue prandin 2mg with breakfast AND dinner. \par -check bs qdaily\par -HbA1C at goal between 7 and 8%.\par -She declines a flu shot at  this time.\par 2) Hypothyroidism: TSH goal: 0.2-3. Continue Synthroid.\par 3) HTN: continue current regimen, at goal, less than 140/90. \par 4) Dyslipidemia: statin tx: LDL is 71 on Crestor 20mg po qhs.\par 5) Insomnia: pt does not leave her room so we discussed sleep hygiene. Also concerned that she feels down so reached out to Dr. Knutson to see if she can go to cardiac rehab to get out of the house and improve her functionality. Left the patient a vm that she needs to call the office for a referral.\par Get labs from PCP.\par RTC in 4 months\par CC: Dr. Esqueda and Dr. Patel.

## 2019-11-22 NOTE — HISTORY OF PRESENT ILLNESS
[FreeTextEntry1] : 68 yo female with hx of t2dm controlled (HbA1c 7.8%) x 30 years with neuropathy, microalbuminuria, cad and cva here for f/u. Pt takes Prandin 2mg po with b'fast and dinner skips at lunch as she only has a salad. She thinks her am bs are poor as she stays up all night. She was given melatonin which did not help.\par She was hospitalized this past late July for 2 weeks. She was supposed to have a clipping but she was found to have a MI. After which time she went to rehab. She was hospitalized again at [facility] in August. Due to this she has lost 10 pounds. Her appetite is fair s/p intubation months ago. Also using breo has made things difficult but ENT found no issues when he scoped her. Her told her to rinse her mouth after using it. She also endorses more raspiness in her voice and dysphagia with liquids only. \par \par BS - done in the morning\par 11/21: 126\par 11/19: 167\par 11/18: 126\par 11/17: 153\par 11/16: 115\par 11/14: 118\par 11/13: 171\par 11/12: 168\par 11/11: 160\par 11/10: 188\par PCP: Dr. Fields\par Cards: Dr. Patel\par ENT: Dr. Veliz\par Pulmonary: Dr. Ceron\par CHF: Dr. Swapna Knutson

## 2019-11-22 NOTE — ADDENDUM
[FreeTextEntry1] : 11/22/19: BEN called her PCP's office who notes that the last labs they had for the patient was the ones I sent in March.

## 2020-04-08 ENCOUNTER — RX RENEWAL (OUTPATIENT)
Age: 70
End: 2020-04-08

## 2020-04-16 ENCOUNTER — APPOINTMENT (OUTPATIENT)
Dept: ENDOCRINOLOGY | Facility: CLINIC | Age: 70
End: 2020-04-16

## 2020-05-29 NOTE — CHART NOTE - NSCHARTNOTEFT_GEN_A_CORE
"Kimberlee Kinney is a 1 year old female who is being evaluated via a billable video visit.      The parent/guardian has been notified of following:     \"This video visit will be conducted via a call between you, your child, and your child's physician/provider. We have found that certain health care needs can be provided without the need for an in-person physical exam.  This service lets us provide the care you need with a video conversation.  If a prescription is necessary we can send it directly to your pharmacy.  If lab work is needed we can place an order for that and you can then stop by our lab to have the test done at a later time.    Video visits are billed at different rates depending on your insurance coverage.  Please reach out to your insurance provider with any questions.    If during the course of the call the physician/provider feels a video visit is not appropriate, you will not be charged for this service.\"    Parent/guardian has given verbal consent for Video visit? Yes    How would you like to obtain your AVS? Mail a copy    Parent/guardian would like the video invitation sent by: Text to cell phone: [phone]    Will anyone else be joining your video visit? No        Video-Visit Details    Type of service:  Video Visit    [facility]     Platform used for Video Visit: Ron Davis CMA        " Instructed to complete discharge on this patient   Patient has follow up appointment with her private cardiologist tomorrow - does not want to persue any further cardiac work up as in patient  No acute distress, no complaints - seen this AM ambulating in hallway with physical therapist, without oxygen - appeared comfortable  Discharge plan and medications confirmed with Dr Dawson

## 2020-06-20 NOTE — ED ADULT NURSE NOTE - NSFALLRSKASSESSDT_ED_ALL_ED
Calorie Count  Intake recorded for: 6/19  Total Kcals: 475 Total Protein: 22g  Kcals from Hospital Food: 475  Protein: 22g  Kcals from Outside Food (average):0 Protein: 0g  # Meals Recorded: 100% ice cream, peaches, 75% hot roast beef & mashed potatoes w/ gravy  # Supplements Recorded: 0     11-Sep-2019 21:43

## 2020-07-30 ENCOUNTER — INPATIENT (INPATIENT)
Facility: HOSPITAL | Age: 70
LOS: 0 days | Discharge: ANOTHER HOSPITAL | End: 2020-07-31
Attending: INTERNAL MEDICINE | Admitting: INTERNAL MEDICINE
Payer: MEDICARE

## 2020-07-30 VITALS
SYSTOLIC BLOOD PRESSURE: 132 MMHG | TEMPERATURE: 98 F | OXYGEN SATURATION: 98 % | DIASTOLIC BLOOD PRESSURE: 82 MMHG | RESPIRATION RATE: 20 BRPM | HEART RATE: 110 BPM

## 2020-07-30 LAB
ALBUMIN SERPL ELPH-MCNC: 4.2 G/DL — SIGNIFICANT CHANGE UP (ref 3.3–5)
ALP SERPL-CCNC: 69 U/L — SIGNIFICANT CHANGE UP (ref 40–120)
ALT FLD-CCNC: 10 U/L — SIGNIFICANT CHANGE UP (ref 4–33)
ANION GAP SERPL CALC-SCNC: 22 MMO/L — HIGH (ref 7–14)
APTT BLD: 31.5 SEC — SIGNIFICANT CHANGE UP (ref 27–36.3)
AST SERPL-CCNC: 26 U/L — SIGNIFICANT CHANGE UP (ref 4–32)
BASE EXCESS BLDV CALC-SCNC: -0.9 MMOL/L — SIGNIFICANT CHANGE UP
BASOPHILS # BLD AUTO: 0.04 K/UL — SIGNIFICANT CHANGE UP (ref 0–0.2)
BASOPHILS NFR BLD AUTO: 0.4 % — SIGNIFICANT CHANGE UP (ref 0–2)
BILIRUB SERPL-MCNC: 0.4 MG/DL — SIGNIFICANT CHANGE UP (ref 0.2–1.2)
BLOOD GAS VENOUS - CREATININE: 2.64 MG/DL — HIGH (ref 0.5–1.3)
BLOOD GAS VENOUS - FIO2: 21 — SIGNIFICANT CHANGE UP
BUN SERPL-MCNC: 46 MG/DL — HIGH (ref 7–23)
CALCIUM SERPL-MCNC: 9.4 MG/DL — SIGNIFICANT CHANGE UP (ref 8.4–10.5)
CHLORIDE BLDV-SCNC: 110 MMOL/L — HIGH (ref 96–108)
CHLORIDE SERPL-SCNC: 101 MMOL/L — SIGNIFICANT CHANGE UP (ref 98–107)
CO2 SERPL-SCNC: 19 MMOL/L — LOW (ref 22–31)
CREAT SERPL-MCNC: 2.6 MG/DL — HIGH (ref 0.5–1.3)
EOSINOPHIL # BLD AUTO: 0.07 K/UL — SIGNIFICANT CHANGE UP (ref 0–0.5)
EOSINOPHIL NFR BLD AUTO: 0.6 % — SIGNIFICANT CHANGE UP (ref 0–6)
GAS PNL BLDV: 140 MMOL/L — SIGNIFICANT CHANGE UP (ref 136–146)
GLUCOSE BLDV-MCNC: 157 MG/DL — HIGH (ref 70–99)
GLUCOSE SERPL-MCNC: 171 MG/DL — HIGH (ref 70–99)
HCO3 BLDV-SCNC: 22 MMOL/L — SIGNIFICANT CHANGE UP (ref 20–27)
HCT VFR BLD CALC: 39.2 % — SIGNIFICANT CHANGE UP (ref 34.5–45)
HCT VFR BLDV CALC: 38 % — SIGNIFICANT CHANGE UP (ref 34.5–45)
HGB BLD-MCNC: 12.2 G/DL — SIGNIFICANT CHANGE UP (ref 11.5–15.5)
HGB BLDV-MCNC: 12.4 G/DL — SIGNIFICANT CHANGE UP (ref 11.5–15.5)
IMM GRANULOCYTES NFR BLD AUTO: 0.3 % — SIGNIFICANT CHANGE UP (ref 0–1.5)
INR BLD: 1.26 — HIGH (ref 0.88–1.17)
LACTATE BLDV-MCNC: 3.6 MMOL/L — HIGH (ref 0.5–2)
LYMPHOCYTES # BLD AUTO: 1.2 K/UL — SIGNIFICANT CHANGE UP (ref 1–3.3)
LYMPHOCYTES # BLD AUTO: 10.6 % — LOW (ref 13–44)
MAGNESIUM SERPL-MCNC: 2.3 MG/DL — SIGNIFICANT CHANGE UP (ref 1.6–2.6)
MCHC RBC-ENTMCNC: 24.6 PG — LOW (ref 27–34)
MCHC RBC-ENTMCNC: 31.1 % — LOW (ref 32–36)
MCV RBC AUTO: 79 FL — LOW (ref 80–100)
MONOCYTES # BLD AUTO: 0.64 K/UL — SIGNIFICANT CHANGE UP (ref 0–0.9)
MONOCYTES NFR BLD AUTO: 5.6 % — SIGNIFICANT CHANGE UP (ref 2–14)
NEUTROPHILS # BLD AUTO: 9.36 K/UL — HIGH (ref 1.8–7.4)
NEUTROPHILS NFR BLD AUTO: 82.5 % — HIGH (ref 43–77)
NRBC # FLD: 0 K/UL — SIGNIFICANT CHANGE UP (ref 0–0)
PCO2 BLDV: 43 MMHG — SIGNIFICANT CHANGE UP (ref 41–51)
PH BLDV: 7.37 PH — SIGNIFICANT CHANGE UP (ref 7.32–7.43)
PHOSPHATE SERPL-MCNC: 3.2 MG/DL — SIGNIFICANT CHANGE UP (ref 2.5–4.5)
PLATELET # BLD AUTO: 122 K/UL — LOW (ref 150–400)
PMV BLD: SIGNIFICANT CHANGE UP FL (ref 7–13)
PO2 BLDV: < 24 MMHG — LOW (ref 35–40)
POTASSIUM BLDV-SCNC: 3.6 MMOL/L — SIGNIFICANT CHANGE UP (ref 3.4–4.5)
POTASSIUM SERPL-MCNC: 3.5 MMOL/L — SIGNIFICANT CHANGE UP (ref 3.5–5.3)
POTASSIUM SERPL-SCNC: 3.5 MMOL/L — SIGNIFICANT CHANGE UP (ref 3.5–5.3)
PROT SERPL-MCNC: 7.8 G/DL — SIGNIFICANT CHANGE UP (ref 6–8.3)
PROTHROM AB SERPL-ACNC: 14.2 SEC — HIGH (ref 9.8–13.1)
RBC # BLD: 4.96 M/UL — SIGNIFICANT CHANGE UP (ref 3.8–5.2)
RBC # FLD: 17.2 % — HIGH (ref 10.3–14.5)
SAO2 % BLDV: 31 % — LOW (ref 60–85)
SODIUM SERPL-SCNC: 142 MMOL/L — SIGNIFICANT CHANGE UP (ref 135–145)
TROPONIN T, HIGH SENSITIVITY: 110 NG/L — CRITICAL HIGH (ref ?–14)
WBC # BLD: 11.34 K/UL — HIGH (ref 3.8–10.5)
WBC # FLD AUTO: 11.34 K/UL — HIGH (ref 3.8–10.5)

## 2020-07-30 PROCEDURE — 99285 EMERGENCY DEPT VISIT HI MDM: CPT

## 2020-07-30 PROCEDURE — 71045 X-RAY EXAM CHEST 1 VIEW: CPT | Mod: 26

## 2020-07-30 RX ORDER — SODIUM CHLORIDE 9 MG/ML
500 INJECTION INTRAMUSCULAR; INTRAVENOUS; SUBCUTANEOUS ONCE
Refills: 0 | Status: COMPLETED | OUTPATIENT
Start: 2020-07-30 | End: 2020-07-30

## 2020-07-30 RX ORDER — METOPROLOL TARTRATE 50 MG
2.5 TABLET ORAL ONCE
Refills: 0 | Status: COMPLETED | OUTPATIENT
Start: 2020-07-30 | End: 2020-07-30

## 2020-07-30 RX ORDER — ONDANSETRON 8 MG/1
4 TABLET, FILM COATED ORAL ONCE
Refills: 0 | Status: COMPLETED | OUTPATIENT
Start: 2020-07-30 | End: 2020-07-30

## 2020-07-30 RX ORDER — DEXTROSE 50 % IN WATER 50 %
50 SYRINGE (ML) INTRAVENOUS ONCE
Refills: 0 | Status: COMPLETED | OUTPATIENT
Start: 2020-07-30 | End: 2020-07-30

## 2020-07-30 RX ORDER — METOPROLOL TARTRATE 50 MG
5 TABLET ORAL ONCE
Refills: 0 | Status: COMPLETED | OUTPATIENT
Start: 2020-07-30 | End: 2020-07-30

## 2020-07-30 RX ADMIN — Medication 2.5 MILLIGRAM(S): at 23:30

## 2020-07-30 RX ADMIN — SODIUM CHLORIDE 500 MILLILITER(S): 9 INJECTION INTRAMUSCULAR; INTRAVENOUS; SUBCUTANEOUS at 22:11

## 2020-07-30 RX ADMIN — Medication 50 MILLILITER(S): at 21:29

## 2020-07-30 RX ADMIN — ONDANSETRON 4 MILLIGRAM(S): 8 TABLET, FILM COATED ORAL at 22:11

## 2020-07-30 RX ADMIN — SODIUM CHLORIDE 500 MILLILITER(S): 9 INJECTION INTRAMUSCULAR; INTRAVENOUS; SUBCUTANEOUS at 23:07

## 2020-07-30 NOTE — ED ADULT NURSE NOTE - NSIMPLEMENTINTERV_GEN_ALL_ED
Implemented All Fall Risk Interventions:  [place] to call system. Call bell, personal items and telephone within reach. Instruct patient to call for assistance. Room bathroom lighting operational. Non-slip footwear when patient is off stretcher. Physically safe environment: no spills, clutter or unnecessary equipment. Stretcher in lowest position, wheels locked, appropriate side rails in place. Provide visual cue, wrist band, yellow gown, etc. Monitor gait and stability. Monitor for mental status changes and reorient to person, place, and time. Review medications for side effects contributing to fall risk. Reinforce activity limits and safety measures with patient and family.

## 2020-07-30 NOTE — ED PROVIDER NOTE - NS ED ROS FT
Gen: Denies fever, weight loss  CV: Denies chest pain, palpitations  Skin: Denies rash, erythema, color changes  Resp: Denies SOB, cough  GI: Denies constipation, + nausea, + vomiting, + abdominal pain  Msk: Denies back pain, LE swelling, extremity pain  : Denies dysuria, increased frequency  Neuro: Denies LOC, weakness, seizures  Psych: Denies hx of psych, hallucinations  Skin: no ecchymoses, skin tears

## 2020-07-30 NOTE — ED ADULT NURSE NOTE - PAIN RATING/NUMBER SCALE (0-10): REST
7 Double Island Pedicle Flap Text: The defect edges were debeveled with a #15 scalpel blade.  Given the location of the defect, shape of the defect and the proximity to free margins a double island pedicle advancement flap was deemed most appropriate.  Using a sterile surgical marker, an appropriate advancement flap was drawn incorporating the defect, outlining the appropriate donor tissue and placing the expected incisions within the relaxed skin tension lines where possible.    The area thus outlined was incised deep to adipose tissue with a #15 scalpel blade.  The skin margins were undermined to an appropriate distance in all directions around the primary defect and laterally outward around the island pedicle utilizing iris scissors.  There was minimal undermining beneath the pedicle flap.

## 2020-07-30 NOTE — ED PROVIDER NOTE - PROGRESS NOTE DETAILS
Josephine: delay in giving medications for HR control. patient initially refusing medications, now upset and explaining that she is fearful and wants her family here. patient reassured and family called at patient bedside. patient feeling better, comfortable receiving medications in ED. maxwell: per cardiology, do not believe pt needs emergent cath. Spoke to oncall physician for [facility] and shared cardiology's comments/note as well as reiterated prior lab findings, they requested admission to Dr. Lino. Pt given aspirin, already anticoagulated on xarelto.

## 2020-07-30 NOTE — ED PROVIDER NOTE - OBJECTIVE STATEMENT
70F w/ hx NSTEMI, thalassemia, asthma, HLD, CHF, CAD, DM, HTN, CVA x2 on xarelto p/w abd pain, vomiting. Pt reporting 3 day hx of generalized weakness w/ decreased PO intake, today began NBNB vomiting w/ nausea states she last stooled y/d, denies passing gas since yesterday. Denies hx abd surgery, no hx obstruction, no recent fevers, chills, other abdominal/urinary symptoms, no extremity symptoms (residual R sided weakness from strokes).

## 2020-07-30 NOTE — ED PROVIDER NOTE - CARE PLAN
Principal Discharge DX:	Vomiting Principal Discharge DX:	Vomiting  Secondary Diagnosis:	Renal failure

## 2020-07-30 NOTE — ED PROVIDER NOTE - PHYSICAL EXAMINATION
Gen: distressed, vomiting in room  HEENT: EOMI, no nasal discharge, mucous membranes dry  CV: tachycardic, no M/R/G  Resp: CTAB, no W/R/R  GI: Abdomen soft non-distended, NTTP, no masses  MSK: No open wounds, no bruising, no LE edema  Neuro: A&Ox4, following commands, moving all four extremities spontaneously  Psych: appropriate mood, denies AH, VH, SI

## 2020-07-30 NOTE — ED PROVIDER NOTE - CLINICAL SUMMARY MEDICAL DECISION MAKING FREE TEXT BOX
70F extensive hx p/w vomiting, hypoglycemic, will send labs w/ ekg, Ct abd, zofran, fluids, amp of d50, concern for poss obstruction vs cardiac etiology,

## 2020-07-30 NOTE — ED PROVIDER NOTE - ATTENDING CONTRIBUTION TO CARE
70F with hx of CHF, CAD, Hypothyroidism, DM, GERD, HTN, PVD, Asthma p/w vomiting x1 day. patient states she hasn't been feeling well over the past few days, generally unwell. today vomiting started, food filled. no fevers, dysuria, cough, cp, sob, abdominal pain.    ***GEN - NAD; well appearing; A+O x3 ***HEAD - NC/AT ***EYES/NOSE - PERRL, EOMI, mucous membranes moist, no discharge ***THROAT: Oral cavity and pharynx normal. No inflammation, swelling, exudate, or lesions.  ***NECK: Neck supple, non-tender without lymphadenopathy, no masses, no thyromegaly. ***PULMONARY - CTA b/l, symmetric breath sounds. ***CARDIAC -s1s2, RRR, no M,G,R  ***ABDOMEN - +BS, ND, NT, soft, no guarding, no rebound, no masses   ***BACK - no CVA tenderness, Normal  spine ***EXTREMITIES - symmetric pulses, 2+ dp, capillary refill < 2 seconds, no clubbing, no cyanosis, no edema ***SKIN - no rash or bruising   ***NEUROLOGIC - alert, CN 2-12 intact    MDM: 70F with vomiting, hypoglycemia and feeling generally unwell. labs, with ekg, ct a/p, zofran, fluids, amp d50.

## 2020-07-30 NOTE — ED ADULT NURSE NOTE - OBJECTIVE STATEMENT
Nadja AGUAYO. Pt presents to room 16 A&Ox4, ambulatory, c/o abdominal pain and nausea x1 day. Pt states that she has not been feeling well for the last week. Pt states she vomited multiple times today and that the vomit was "filled with food." Pt states she has not eaten anything since about noon today. Pt appears uncomfortable. MD at bedside. Fingerstick 67 upon arrival to room. Medications given per MD orders. Respirations even and unlabored. Abdomen soft. Skin warm and dry. 20 gauge IV placed in right ac. Blood drawn and sent to lab. 20 gauge IV in left ac via EMS.  Pt placed on cardiac monitor. Call bell within reach. Report given to primary RN Nayely.

## 2020-07-30 NOTE — ED ADULT NURSE NOTE - CHIEF COMPLAINT QUOTE
ems states pt having abdominal pain, weakness x 3 days with vomiting that began today. Last BM yesterday. pt denies recent fever, cough.  pt has right sided weakness from prior CVA. hx afib (on eliquis) , htn, dm. pt arrives w 20g to left ac. FS 74 in triage.

## 2020-07-31 ENCOUNTER — TRANSCRIPTION ENCOUNTER (OUTPATIENT)
Age: 70
End: 2020-07-31

## 2020-07-31 VITALS
RESPIRATION RATE: 17 BRPM | TEMPERATURE: 98 F | OXYGEN SATURATION: 100 % | HEART RATE: 71 BPM | SYSTOLIC BLOOD PRESSURE: 123 MMHG | DIASTOLIC BLOOD PRESSURE: 77 MMHG

## 2020-07-31 DIAGNOSIS — R11.10 VOMITING, UNSPECIFIED: ICD-10-CM

## 2020-07-31 DIAGNOSIS — I48.91 UNSPECIFIED ATRIAL FIBRILLATION: ICD-10-CM

## 2020-07-31 DIAGNOSIS — I21.4 NON-ST ELEVATION (NSTEMI) MYOCARDIAL INFARCTION: ICD-10-CM

## 2020-07-31 DIAGNOSIS — I10 ESSENTIAL (PRIMARY) HYPERTENSION: ICD-10-CM

## 2020-07-31 DIAGNOSIS — Z29.9 ENCOUNTER FOR PROPHYLACTIC MEASURES, UNSPECIFIED: ICD-10-CM

## 2020-07-31 DIAGNOSIS — E03.9 HYPOTHYROIDISM, UNSPECIFIED: ICD-10-CM

## 2020-07-31 DIAGNOSIS — N17.9 ACUTE KIDNEY FAILURE, UNSPECIFIED: ICD-10-CM

## 2020-07-31 DIAGNOSIS — I50.22 CHRONIC SYSTOLIC (CONGESTIVE) HEART FAILURE: ICD-10-CM

## 2020-07-31 DIAGNOSIS — E11.65 TYPE 2 DIABETES MELLITUS WITH HYPERGLYCEMIA: ICD-10-CM

## 2020-07-31 DIAGNOSIS — Z02.9 ENCOUNTER FOR ADMINISTRATIVE EXAMINATIONS, UNSPECIFIED: ICD-10-CM

## 2020-07-31 DIAGNOSIS — N19 UNSPECIFIED KIDNEY FAILURE: ICD-10-CM

## 2020-07-31 LAB
ANION GAP SERPL CALC-SCNC: 17 MMO/L — HIGH (ref 7–14)
APTT BLD: 45 SEC — HIGH (ref 27–36.3)
BUN SERPL-MCNC: 52 MG/DL — HIGH (ref 7–23)
CALCIUM SERPL-MCNC: 9.1 MG/DL — SIGNIFICANT CHANGE UP (ref 8.4–10.5)
CHLORIDE SERPL-SCNC: 104 MMOL/L — SIGNIFICANT CHANGE UP (ref 98–107)
CK MB BLD-MCNC: 17.46 NG/ML — HIGH (ref 1–4.7)
CK MB BLD-MCNC: 29.89 NG/ML — HIGH (ref 1–4.7)
CK MB BLD-MCNC: 9.6 — HIGH (ref 0–2.5)
CK MB BLD-MCNC: 9.8 — HIGH (ref 0–2.5)
CK SERPL-CCNC: 178 U/L — HIGH (ref 25–170)
CK SERPL-CCNC: 310 U/L — HIGH (ref 25–170)
CO2 SERPL-SCNC: 18 MMOL/L — LOW (ref 22–31)
CREAT SERPL-MCNC: 2.52 MG/DL — HIGH (ref 0.5–1.3)
DIGOXIN SERPL-MCNC: 1.4 NG/ML — SIGNIFICANT CHANGE UP (ref 0.8–2)
GLUCOSE BLDC GLUCOMTR-MCNC: 109 MG/DL — HIGH (ref 70–99)
GLUCOSE BLDC GLUCOMTR-MCNC: 186 MG/DL — HIGH (ref 70–99)
GLUCOSE SERPL-MCNC: 202 MG/DL — HIGH (ref 70–99)
HCT VFR BLD CALC: 37.9 % — SIGNIFICANT CHANGE UP (ref 34.5–45)
HGB BLD-MCNC: 12 G/DL — SIGNIFICANT CHANGE UP (ref 11.5–15.5)
LACTATE BLDV-MCNC: 1.6 MMOL/L — SIGNIFICANT CHANGE UP (ref 0.5–2)
MCHC RBC-ENTMCNC: 25.3 PG — LOW (ref 27–34)
MCHC RBC-ENTMCNC: 31.7 % — LOW (ref 32–36)
MCV RBC AUTO: 79.8 FL — LOW (ref 80–100)
NRBC # FLD: 0 K/UL — SIGNIFICANT CHANGE UP (ref 0–0)
NT-PROBNP SERPL-SCNC: 6022 PG/ML — SIGNIFICANT CHANGE UP
PLATELET # BLD AUTO: 108 K/UL — LOW (ref 150–400)
PMV BLD: SIGNIFICANT CHANGE UP FL (ref 7–13)
POTASSIUM SERPL-MCNC: 4.5 MMOL/L — SIGNIFICANT CHANGE UP (ref 3.5–5.3)
POTASSIUM SERPL-SCNC: 4.5 MMOL/L — SIGNIFICANT CHANGE UP (ref 3.5–5.3)
RBC # BLD: 4.75 M/UL — SIGNIFICANT CHANGE UP (ref 3.8–5.2)
RBC # FLD: 17 % — HIGH (ref 10.3–14.5)
SARS-COV-2 RNA SPEC QL NAA+PROBE: SIGNIFICANT CHANGE UP
SODIUM SERPL-SCNC: 139 MMOL/L — SIGNIFICANT CHANGE UP (ref 135–145)
TROPONIN T, HIGH SENSITIVITY: 252 NG/L — CRITICAL HIGH (ref ?–14)
TROPONIN T, HIGH SENSITIVITY: 705 NG/L — CRITICAL HIGH (ref ?–14)
TSH SERPL-MCNC: 0.16 UIU/ML — LOW (ref 0.27–4.2)
WBC # BLD: 5.86 K/UL — SIGNIFICANT CHANGE UP (ref 3.8–10.5)
WBC # FLD AUTO: 5.86 K/UL — SIGNIFICANT CHANGE UP (ref 3.8–10.5)

## 2020-07-31 PROCEDURE — 99223 1ST HOSP IP/OBS HIGH 75: CPT

## 2020-07-31 PROCEDURE — 93010 ELECTROCARDIOGRAM REPORT: CPT

## 2020-07-31 PROCEDURE — 74176 CT ABD & PELVIS W/O CONTRAST: CPT | Mod: 26

## 2020-07-31 PROCEDURE — 93306 TTE W/DOPPLER COMPLETE: CPT | Mod: 26

## 2020-07-31 RX ORDER — PREGABALIN 225 MG/1
1 CAPSULE ORAL
Qty: 0 | Refills: 0 | DISCHARGE
Start: 2020-07-31

## 2020-07-31 RX ORDER — RIVAROXABAN 15 MG-20MG
1 KIT ORAL
Qty: 0 | Refills: 0 | DISCHARGE

## 2020-07-31 RX ORDER — HEPARIN SODIUM 5000 [USP'U]/ML
INJECTION INTRAVENOUS; SUBCUTANEOUS
Qty: 25000 | Refills: 0 | Status: DISCONTINUED | OUTPATIENT
Start: 2020-07-31 | End: 2020-07-31

## 2020-07-31 RX ORDER — DEXTROSE 50 % IN WATER 50 %
25 SYRINGE (ML) INTRAVENOUS ONCE
Refills: 0 | Status: DISCONTINUED | OUTPATIENT
Start: 2020-07-31 | End: 2020-07-31

## 2020-07-31 RX ORDER — HEPARIN SODIUM 5000 [USP'U]/ML
4100 INJECTION INTRAVENOUS; SUBCUTANEOUS EVERY 6 HOURS
Refills: 0 | Status: DISCONTINUED | OUTPATIENT
Start: 2020-07-31 | End: 2020-07-31

## 2020-07-31 RX ORDER — DEXTROSE 50 % IN WATER 50 %
12.5 SYRINGE (ML) INTRAVENOUS ONCE
Refills: 0 | Status: DISCONTINUED | OUTPATIENT
Start: 2020-07-31 | End: 2020-07-31

## 2020-07-31 RX ORDER — SACUBITRIL AND VALSARTAN 24; 26 MG/1; MG/1
0 TABLET, FILM COATED ORAL
Qty: 180 | Refills: 0 | DISCHARGE

## 2020-07-31 RX ORDER — GLUCAGON INJECTION, SOLUTION 0.5 MG/.1ML
1 INJECTION, SOLUTION SUBCUTANEOUS ONCE
Refills: 0 | Status: DISCONTINUED | OUTPATIENT
Start: 2020-07-31 | End: 2020-07-31

## 2020-07-31 RX ORDER — HEPARIN SODIUM 5000 [USP'U]/ML
8 INJECTION INTRAVENOUS; SUBCUTANEOUS
Qty: 0 | Refills: 0 | DISCHARGE
Start: 2020-07-31

## 2020-07-31 RX ORDER — SACUBITRIL AND VALSARTAN 24; 26 MG/1; MG/1
1 TABLET, FILM COATED ORAL
Qty: 0 | Refills: 0 | DISCHARGE

## 2020-07-31 RX ORDER — AMLODIPINE BESYLATE 2.5 MG/1
1 TABLET ORAL
Qty: 0 | Refills: 0 | DISCHARGE

## 2020-07-31 RX ORDER — PANTOPRAZOLE SODIUM 20 MG/1
0 TABLET, DELAYED RELEASE ORAL
Qty: 90 | Refills: 0 | DISCHARGE

## 2020-07-31 RX ORDER — CLOPIDOGREL BISULFATE 75 MG/1
1 TABLET, FILM COATED ORAL
Qty: 30 | Refills: 0
Start: 2020-07-31 | End: 2020-08-29

## 2020-07-31 RX ORDER — APIXABAN 2.5 MG/1
0 TABLET, FILM COATED ORAL
Qty: 60 | Refills: 0 | DISCHARGE

## 2020-07-31 RX ORDER — FUROSEMIDE 40 MG
1 TABLET ORAL
Qty: 0 | Refills: 0 | DISCHARGE

## 2020-07-31 RX ORDER — METOPROLOL TARTRATE 50 MG
1 TABLET ORAL
Qty: 0 | Refills: 0 | DISCHARGE
Start: 2020-07-31

## 2020-07-31 RX ORDER — ASPIRIN/CALCIUM CARB/MAGNESIUM 324 MG
162 TABLET ORAL DAILY
Refills: 0 | Status: DISCONTINUED | OUTPATIENT
Start: 2020-07-31 | End: 2020-07-31

## 2020-07-31 RX ORDER — DIGOXIN 250 MCG
0 TABLET ORAL
Qty: 90 | Refills: 0 | DISCHARGE

## 2020-07-31 RX ORDER — ROSUVASTATIN CALCIUM 5 MG/1
0 TABLET ORAL
Qty: 90 | Refills: 0 | DISCHARGE

## 2020-07-31 RX ORDER — CARVEDILOL PHOSPHATE 80 MG/1
1 CAPSULE, EXTENDED RELEASE ORAL
Qty: 0 | Refills: 0 | DISCHARGE

## 2020-07-31 RX ORDER — BUDESONIDE AND FORMOTEROL FUMARATE DIHYDRATE 160; 4.5 UG/1; UG/1
2 AEROSOL RESPIRATORY (INHALATION)
Refills: 0 | Status: DISCONTINUED | OUTPATIENT
Start: 2020-07-31 | End: 2020-07-31

## 2020-07-31 RX ORDER — INSULIN LISPRO 100/ML
VIAL (ML) SUBCUTANEOUS
Refills: 0 | Status: DISCONTINUED | OUTPATIENT
Start: 2020-07-31 | End: 2020-07-31

## 2020-07-31 RX ORDER — REPAGLINIDE 1 MG/1
0 TABLET ORAL
Qty: 180 | Refills: 0 | DISCHARGE

## 2020-07-31 RX ORDER — PREGABALIN 225 MG/1
1000 CAPSULE ORAL DAILY
Refills: 0 | Status: DISCONTINUED | OUTPATIENT
Start: 2020-07-31 | End: 2020-07-31

## 2020-07-31 RX ORDER — LEVOTHYROXINE SODIUM 125 MCG
0 TABLET ORAL
Qty: 90 | Refills: 0 | DISCHARGE

## 2020-07-31 RX ORDER — ASPIRIN/CALCIUM CARB/MAGNESIUM 324 MG
81 TABLET ORAL DAILY
Refills: 0 | Status: DISCONTINUED | OUTPATIENT
Start: 2020-07-31 | End: 2020-07-31

## 2020-07-31 RX ORDER — REPAGLINIDE 1 MG/1
1 TABLET ORAL
Qty: 0 | Refills: 0 | DISCHARGE

## 2020-07-31 RX ORDER — INSULIN LISPRO 100/ML
VIAL (ML) SUBCUTANEOUS AT BEDTIME
Refills: 0 | Status: DISCONTINUED | OUTPATIENT
Start: 2020-07-31 | End: 2020-07-31

## 2020-07-31 RX ORDER — DEXTROSE 50 % IN WATER 50 %
15 SYRINGE (ML) INTRAVENOUS ONCE
Refills: 0 | Status: DISCONTINUED | OUTPATIENT
Start: 2020-07-31 | End: 2020-07-31

## 2020-07-31 RX ORDER — METOPROLOL TARTRATE 50 MG
25 TABLET ORAL
Refills: 0 | Status: DISCONTINUED | OUTPATIENT
Start: 2020-07-31 | End: 2020-07-31

## 2020-07-31 RX ORDER — LEVOTHYROXINE SODIUM 125 MCG
112 TABLET ORAL DAILY
Refills: 0 | Status: DISCONTINUED | OUTPATIENT
Start: 2020-07-31 | End: 2020-07-31

## 2020-07-31 RX ORDER — ASPIRIN/CALCIUM CARB/MAGNESIUM 324 MG
1 TABLET ORAL
Qty: 0 | Refills: 0 | DISCHARGE
Start: 2020-07-31

## 2020-07-31 RX ORDER — LEVOTHYROXINE SODIUM 125 MCG
1 TABLET ORAL
Qty: 0 | Refills: 0 | DISCHARGE
Start: 2020-07-31

## 2020-07-31 RX ORDER — CLOPIDOGREL BISULFATE 75 MG/1
600 TABLET, FILM COATED ORAL ONCE
Refills: 0 | Status: COMPLETED | OUTPATIENT
Start: 2020-07-31 | End: 2020-07-31

## 2020-07-31 RX ORDER — BUDESONIDE AND FORMOTEROL FUMARATE DIHYDRATE 160; 4.5 UG/1; UG/1
2 AEROSOL RESPIRATORY (INHALATION)
Qty: 0 | Refills: 0 | DISCHARGE

## 2020-07-31 RX ORDER — SODIUM CHLORIDE 9 MG/ML
1000 INJECTION, SOLUTION INTRAVENOUS
Refills: 0 | Status: DISCONTINUED | OUTPATIENT
Start: 2020-07-31 | End: 2020-07-31

## 2020-07-31 RX ADMIN — HEPARIN SODIUM 800 UNIT(S)/HR: 5000 INJECTION INTRAVENOUS; SUBCUTANEOUS at 06:25

## 2020-07-31 RX ADMIN — Medication 1: at 13:38

## 2020-07-31 RX ADMIN — Medication 25 MILLIGRAM(S): at 06:31

## 2020-07-31 RX ADMIN — Medication 81 MILLIGRAM(S): at 17:12

## 2020-07-31 RX ADMIN — PREGABALIN 1000 MICROGRAM(S): 225 CAPSULE ORAL at 17:12

## 2020-07-31 RX ADMIN — CLOPIDOGREL BISULFATE 600 MILLIGRAM(S): 75 TABLET, FILM COATED ORAL at 10:04

## 2020-07-31 RX ADMIN — Medication 25 MILLIGRAM(S): at 17:14

## 2020-07-31 RX ADMIN — HEPARIN SODIUM 950 UNIT(S)/HR: 5000 INJECTION INTRAVENOUS; SUBCUTANEOUS at 16:05

## 2020-07-31 RX ADMIN — Medication 162 MILLIGRAM(S): at 01:06

## 2020-07-31 RX ADMIN — Medication 112 MICROGRAM(S): at 17:12

## 2020-07-31 NOTE — DISCHARGE NOTE PROVIDER - NSDCCPCAREPLAN_GEN_ALL_CORE_FT
PRINCIPAL DISCHARGE DIAGNOSIS  Diagnosis: Non-ST elevation MI (NSTEMI)  Assessment and Plan of Treatment: Continue aspirin, 81mg, plavix 75mg daily ( given 600mg orally once friday 7/31/2020), metoprolol.and heparin drip.  Consider continuing crestor/statin  Hold entresto, norvasc, lasix, xarelto, prandin .  Keep NPO for cath today at [facility] with cardiologist Dr. Knutson and Dr. Yan      SECONDARY DISCHARGE DIAGNOSES  Diagnosis: Diabetes mellitus  Assessment and Plan of Treatment: Hold Prandin and other oral hypoglycemics  1800 calorie diabetic diet/ low salt, low fat , low cholesterol   Continue humalog insulin sliding scale as per hospital protocol    Diagnosis: HTN (Hypertension)  Assessment and Plan of Treatment: HTN (Hypertension)  low salt, low fat, low cholesterol   hold lasix, norvasc for now    Diagnosis: Adult Hypothyroidism  Assessment and Plan of Treatment: Adult Hypothyroidism  continue synthroid    Diagnosis: Afib  Assessment and Plan of Treatment: Afib  Holding xarelto for procedure  ON heparin drip    Diagnosis: Renal failure  Assessment and Plan of Treatment: Renal consult done here at [facility]  Holding entresto and lasix for now  Creatinine =2.5 PRINCIPAL DISCHARGE DIAGNOSIS  Diagnosis: Non-ST elevation MI (NSTEMI)  Assessment and Plan of Treatment: Continue aspirin, 81mg, plavix 75mg daily ( given 600mg orally once friday 7/31/2020), metoprolol.and heparin drip.  Consider continuing crestor/statin  Hold entresto, norvasc, lasix, xarelto, prandin .  Keep NPO for cath at [facility] with cardiologist Dr. Knutson and Dr. Yan      SECONDARY DISCHARGE DIAGNOSES  Diagnosis: Diabetes mellitus  Assessment and Plan of Treatment: Hold Prandin and other oral hypoglycemics  1800 calorie diabetic diet/ low salt, low fat , low cholesterol   Continue humalog insulin sliding scale as per hospital protocol    Diagnosis: Adult Hypothyroidism  Assessment and Plan of Treatment: Continue your thyroid medications as recommended and follow-up with your outpatient provider for continual thyroid function testing and further medication management.      Diagnosis: Afib  Assessment and Plan of Treatment: Please continue your medications as directed and follow-up with your primary provider/cardiologist to further manage your care. Monitor for signs/symptoms of uncontrolled atrial fibrillation, such as, increased heart rate, palpitations, chest pain, dizziness, or shortness of breath - Return to emergency room if these signs/symptoms are present.  Holding xarelto for procedure  ON heparin drip    Diagnosis: HTN (Hypertension)  Assessment and Plan of Treatment: stable Continue blood pressure medication regimen as directed. Follow a low salt/cholesterol diet. Monitor for any visual changes, headaches or dizziness.  Monitor blood pressure regularly.  Follow up with your primary care provider for further management for high blood pressure.  hold lasix, norvasc for now    Diagnosis: Renal failure  Assessment and Plan of Treatment: Renal consult done here at [facility]  Holding entresto and lasix for now  Creatinine =2.5

## 2020-07-31 NOTE — H&P ADULT - NSHPLABSRESULTS_GEN_ALL_CORE
Labs personally reviewed                          12.2   11.34 )-----------( 122      ( 30 Jul 2020 21:40 )             39.2   WBC elevated 11    07-30-20 @ 21:40    142  |  101  |  46<H>             --------------------------< 171<H>     3.5  |  19<L>  | 2.60<H>    eGFR AA: 21  eGFR N-AA: 18    Calcium: 9.4  Phosphorus: 3.2  Magnesium: 2.3    AST: 26    ALT: 10  AlkPhos: 69  Protein: 7.8  Albumin: 4.2  TBili: 0.4  D-Bili: --    Creatinine elevated to 2.6 up from prior in system 0.8 1/2019  Troponin 110--252  CKMB 17.46    CXR personally reviewed by me and no focal consolidation  EKG reviewed by me and my interpretation is Afib, new TWI in V5, V6, I

## 2020-07-31 NOTE — DISCHARGE NOTE PROVIDER - NSDCMRMEDTOKEN_GEN_ALL_CORE_FT
Aspirin Low Dose 81 mg oral tablet, chewable: 1 tab(s) orally once a day  budesonide-formoterol 80 mcg-4.5 mcg/inh inhalation aerosol: 2 puff(s) inhaled 2 times a day   Crestor 20 mg oral tablet: 1 tab(s) orally once a day (at bedtime)  cyanocobalamin 100 mcg oral tablet: 1 tab(s) orally once a day  furosemide 40 mg oral tablet: 1 tab(s) orally once a day  heparin 100 units/mL-D5% intravenous solution: 25,000 units in dextrose 5% 250 mL infuse at 8 milliliter(s) intravenous per hour  levothyroxine 112 mcg (0.112 mg) oral tablet: 1 tab(s) orally once a day  Metoprolol Tartrate 25 mg oral tablet: 1 tab(s) orally 2 times a day  Plavix 75 mg oral tablet: 1 tab(s) orally once a day ( start on 8/1/2020 saturday)- patient given 600mg PO x 1 friday 7/31 Aspirin Low Dose 81 mg oral tablet, chewable: 1 tab(s) orally once a day  Crestor 20 mg oral tablet: 1 tab(s) orally once a day (at bedtime)  cyanocobalamin 1000 mcg oral tablet: 1 tab(s) orally once a day  heparin 100 units/mL-D5% intravenous solution: 25,000 units in dextrose 5% 250 mL infuse at 8 milliliter(s) intravenous per hour  levothyroxine 112 mcg (0.112 mg) oral tablet: 1 tab(s) orally once a day  Metoprolol Tartrate 25 mg oral tablet: 1 tab(s) orally 2 times a day  Plavix 75 mg oral tablet: 1 tab(s) orally once a day ( start on 8/1/2020 saturday)- patient given 600mg PO x 1 friday 7/31

## 2020-07-31 NOTE — CHART NOTE - NSCHARTNOTEFT_GEN_A_CORE
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  Chest pain was 9/10 now it is 4/10.  Patient states she is feeling better now denies any SOB, cough, fevers, dizziness, syncope, back pain, recent sick contact, recent travel.      EKG Afib @ 104 bpm with ST and T wave abnormalities concern for lateral ischemia.  Labs: HsT 110, 252 CKMB 17.46, leukocytosis, Creatinine 2.6  Vitals: hemodynamically stable /55, 100% RA, afebrile, RR 16    Recommendation  Patient is not a candidate for emergent cath at this time, If worsening or persistent chest pain with EKG changes, please call spectra #[phone], will plan for emergent cath.  Patients cardiologist is Zoie Rojas will defer reccs to them call if cant reach, thank you.  Case discussed with Dr. Braswell

## 2020-07-31 NOTE — CHART NOTE - NSCHARTNOTEFT_GEN_A_CORE
Spoke with patient at bedside along with Kaila DODGE, made NPO for possible Cath today. Patient states she wants to be cared for by her outpatient cardiologist at [facility].   Dr. Rivers discussed plan of care with pt's son -to contact pt's cardiologist to determine plan for cath. Awaiting recs.  Nephro consulted for EDUARDO, Endo following

## 2020-07-31 NOTE — H&P ADULT - NSICDXPASTSURGICALHX_GEN_ALL_CORE_FT
PAST SURGICAL HISTORY:  Benign neoplasm of colon 2010    H/O: Hysterectomy     Stented coronary artery 2011

## 2020-07-31 NOTE — H&P ADULT - NSICDXPASTMEDICALHX_GEN_ALL_CORE_FT
PAST MEDICAL HISTORY:  Adult Hypothyroidism     Asthma     CAD (Coronary Artery Disease)     Cardiomegaly     Chronic CHF     CVA (Cerebral Infarction) CVA x 2  2007 and 10/2013    DJD (degenerative joint disease) of lumbar spine     DM (Diabetes Mellitus Screen)     Dysthymia     GERD (gastroesophageal reflux disease)     HTN (Hypertension)     Hyperlipidemia     Hyperuricemia     MI (myocardial infarction) 1993, 2011    NSTEMI (non-ST elevation myocardial infarction)     PVD (peripheral vascular disease)     Thalassemia     Vitamin D deficiency

## 2020-07-31 NOTE — H&P ADULT - ASSESSMENT
70F w/ hx NSTEMI, thalassemia, asthma, HLD, HFrEF (EF 26%), CAD, DM, HTN, CVA x2, Afib on xarelto? presenting with burning chest pain x10 days. Elevated troponin and signs of lateral ischemic on EKG, treating for NSTEMI. EDUARDO noted as well.

## 2020-07-31 NOTE — CONSULT NOTE ADULT - PROBLEM SELECTOR RECOMMENDATION 2
appears euvolemic at this time appears euvolemic at this time  not on diuresis at this time  renal help appreciated  will continue to follow recommendations appears euvolemic at this time  not on diuresis at this time    will continue to follow recommendations

## 2020-07-31 NOTE — CONSULT NOTE ADULT - PROBLEM SELECTOR RECOMMENDATION 5
management per cardiology   on IV heparin unclear etiology   patient states she was never told that her kidney function is abnormal  will continue to monitor  renal help appreciated

## 2020-07-31 NOTE — ED ADULT NURSE REASSESSMENT NOTE - REASSESS COMMUNICATION
Admitt Resident at bedside pt to transport on Monitor to floor.
Resident Jeannine &Attending Marlys aware of trop #2

## 2020-07-31 NOTE — H&P ADULT - PROBLEM SELECTOR PLAN 3
Last EF 26% 1/2019. Euvolemic on exam with LE edema or crackles on exam  -hold lasix and entresto given EDUARDO  -probnp  -TTE

## 2020-07-31 NOTE — CONSULT NOTE ADULT - SUBJECTIVE AND OBJECTIVE BOX
HPI:  70F w/ hx NSTEMI, thalassemia, asthma, HLD, HFrEF (EF 26%), CAD, DM, HTN, CVA x2, Afib on xarelto? presenting with burning chest pain x10 days. Pt states she noticed chest pain that comes and goes for the last ten days, burning in nature. She tried tums without relief. The chest pain is made worse with exertion and relieved with rest at times. Nonradiating, comes and goes and chest pain is improved at this time upon interview. Denies worsens orthopnea (2 pillows at baseline), no PND, no worsened LE edema and has been compliant with CHF medications. Notes some SOB with chest pain but is otherwise at baseline. She is unsure of all her medications but states xarelto was changed to eliquis recently, she is on entresto BID, water pill, synthroid. States she did not take her PM meds prior to coming to hospital. She is unable to mention BP medications or BB. Of note she also had episodes of vomiting today NBNB but did not have abdominal pain or diarrhea, hematochezia.    In ED: rising troponin 110-->252, CKMB 17.46. EKG with signs of lateral ischemia. Given 500cc NS bolus x2, metoprolol IV 2.5mg for afib with RVR and . D50 for hypoglycemia  Cath lab notified and said no emergent cath at this time.    Multiple call attempts to son Stoney Conrad [phone] were unsuccessful. Unable to obtain full medication list. (31 Jul 2020 04:42)  Patient has history of diabetes, on oral meds at home, also has hypothyroidism on synthroid 112 mcg PO daily, no recent hypoglycemic episodes, no polyuria polydipsia. Patient follows up with PCP for diabetes management.    PAST MEDICAL & SURGICAL HISTORY:  NSTEMI (non-ST elevation myocardial infarction)  DJD (degenerative joint disease) of lumbar spine  Dysthymia  Cardiomegaly  Hyperuricemia  Thalassemia  PVD (peripheral vascular disease)  Asthma  GERD (gastroesophageal reflux disease)  Vitamin D deficiency  Hyperlipidemia  MI (myocardial infarction): 1993, 2011  Chronic CHF  Adult Hypothyroidism  CAD (Coronary Artery Disease)  DM (Diabetes Mellitus Screen)  HTN (Hypertension)  CVA (Cerebral Infarction): CVA x 2  2007 and 10/2013  Benign neoplasm of colon: 2010  Stented coronary artery: 2011  H/O: Hysterectomy      FAMILY HISTORY:  FH: breast cancer      Social History:    Outpatient Medications:    MEDICATIONS  (STANDING):  aspirin  chewable 81 milliGRAM(s) Oral daily  clopidogrel Tablet 600 milliGRAM(s) Oral once  dextrose 5%. 1000 milliLiter(s) (50 mL/Hr) IV Continuous <Continuous>  dextrose 50% Injectable 12.5 Gram(s) IV Push once  dextrose 50% Injectable 25 Gram(s) IV Push once  dextrose 50% Injectable 25 Gram(s) IV Push once  heparin  Infusion.  Unit(s)/Hr (8 mL/Hr) IV Continuous <Continuous>  insulin lispro (HumaLOG) corrective regimen sliding scale   SubCutaneous three times a day before meals  insulin lispro (HumaLOG) corrective regimen sliding scale   SubCutaneous at bedtime  metoprolol tartrate 25 milliGRAM(s) Oral two times a day    MEDICATIONS  (PRN):  dextrose 40% Gel 15 Gram(s) Oral once PRN Blood Glucose LESS THAN 70 milliGRAM(s)/deciLiter  glucagon  Injectable 1 milliGRAM(s) IntraMuscular once PRN Glucose <70 milliGRAM(s)/deciLiter  heparin   Injectable 4100 Unit(s) IV Push every 6 hours PRN For aPTT less than 40      Allergies    penicillins (Unknown)    Intolerances    ACE inhibitors (Unknown)  Lipitor (Muscle Pain)  metformin (Diarrhea)    Review of Systems:  Constitutional: No fever, no chills  Eyes: No blurry vision  Neuro: No tremors  HEENT: No pain, no neck swelling  Cardiovascular: No chest pain, no palpitations  Respiratory: No SOB, no cough  GI: No nausea, vomiting, abdominal pain  : No dysuria  Skin: no rash  MSK: Has leg swelling.  Psych: no depression  Endocrine: no polyuria, polydipsia    UNABLE TO OBTAIN    ALL OTHER SYSTEMS REVIEWED AND NEGATIVE        PHYSICAL EXAM:  VITALS: T(C): 36.6 (07-31-20 @ 05:10)  T(F): 97.9 (07-31-20 @ 05:10), Max: 98.1 (07-30-20 @ 21:46)  HR: 77 (07-31-20 @ 06:30) (77 - 130)  BP: 113/61 (07-31-20 @ 06:30) (93/63 - 132/82)  RR:  (17 - 22)  SpO2:  (97% - 100%)  Wt(kg): --  GENERAL: NAD, well-groomed, well-developed  EYES: No proptosis, no lid lag  HEENT:  Atraumatic, Normocephalic  THYROID: Normal size, no palpable nodules  RESPIRATORY: Clear to auscultation bilaterally; No rales, rhonchi, wheezing  CARDIOVASCULAR: Si S2, No murmurs;  GI: Soft, non distended, normal bowel sounds  SKIN: Dry, intact, No rashes or lesions  MUSCULOSKELETAL: Has BL lower extremity edema.  NEURO:  no tremor, sensation decreased in feet BL,    POCT Blood Glucose.: 160 mg/dL (07-31-20 @ 00:52)  POCT Blood Glucose.: 107 mg/dL (07-30-20 @ 22:40)  POCT Blood Glucose.: 126 mg/dL (07-30-20 @ 21:55)  POCT Blood Glucose.: 96 mg/dL (07-30-20 @ 21:41)  POCT Blood Glucose.: 77 mg/dL (07-30-20 @ 21:29)  POCT Blood Glucose.: 68 mg/dL (07-30-20 @ 20:47)  POCT Blood Glucose.: 74 mg/dL (07-30-20 @ 19:32)                            12.2   11.34 )-----------( 122      ( 30 Jul 2020 21:40 )             39.2       07-31    139  |  104  |  52<H>  ----------------------------<  202<H>  4.5   |  18<L>  |  2.52<H>    EGFR if : 21  EGFR if non African American: 19    Ca    9.1      07-31  Mg     2.3     07-30  Phos  3.2     07-30    TPro  7.8  /  Alb  4.2  /  TBili  0.4  /  DBili  x   /  AST  26  /  ALT  10  /  AlkPhos  69  07-30      Thyroid Function Tests:  07-30 @ 23:50 TSH 0.16 FreeT4 -- T3 -- Anti TPO -- Anti Thyroglobulin Ab -- TSI --              Radiology:

## 2020-07-31 NOTE — H&P ADULT - PROBLEM SELECTOR PLAN 7
Transitions of Care Status:  1.  Name of PCP:  2.  PCP Contacted on Admission: [ ] Y    [ x] N  Night admission  3.  PCP contacted at Discharge: [ ] Y    [ ] N    [ ] N/A  4.  Post-Discharge Appointment Date and Location:  5.  Summary of Handoff given to PCP:

## 2020-07-31 NOTE — DISCHARGE NOTE PROVIDER - CARE PROVIDER_API CALL
Raul Rivers  CARDIOVASCULAR DISEASE  [address] [place] [address]  Phone: [phone]  Fax: [phone]  Follow Up Time: Raul Rivers  CARDIOVASCULAR DISEASE  [address] [place] [address]  Phone: [phone]  Fax: [phone]  Follow Up Time:

## 2020-07-31 NOTE — CONSULT NOTE ADULT - ASSESSMENT
AssessmentDMT2: 70y Female with DM T2 with hyperglycemia admitted with nausea vomiting sugars running high,  NPO now.  Vomiting: Being worked up, NPO at this time.  CAD: on medications, no chest pain at this time.  Hypothyroidism: On Synthroid 112 mcg daily, compliant with Synthroid intake, TSH elevated.  HTN: Controlled, on antihypertensive medications.        Marley Hoover MD  Cell: [phone]  Office: [phone]
70F w/ hx NSTEMI, thalassemia, asthma, HLD, HFrEF (EF 26%), CAD, DM, HTN, CVA x2, Afib pw chest pain x 10 days  NSTEMI  EDUARDO at present.  Is this dehydration vs cardiorenal syndrome.    She has moderate to severe MR and severe PHTN.    Hyperthyroidism    1 CVS-She is requesting xfer to [facility].  IF the cardiomems is in place then a reading would help   Trend the cardiac enzymes at present.  Troponins are on up trend    2 Renal-No need to for renal sono.  Refrain from more IVF please.  I suspect lasix and entresto to be restarted soon.    Need to review old labs as well    3 Endo- Hyperthyroidism
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

## 2020-07-31 NOTE — ED ADULT NURSE REASSESSMENT NOTE - STATUS
awaits transport pt transporte delayed due to Fire alarm and then pt in CT scan. awaits transport pt transport delayed due to Fire alarm and then pt in CT scan.

## 2020-07-31 NOTE — DISCHARGE NOTE PROVIDER - HOSPITAL COURSE
70F w/ hx NSTEMI, thalassemia, asthma, HLD, HFrEF (EF 26%), CAD, DM, HTN, CVA x2, Afib on xarelto? presenting with burning chest pain x10 days. Elevated troponin and signs of lateral ischemic on EKG, treating for NSTEMI. EDUARDO noted as well.         + Chest pain/NSTEMI - On hep gtt, ASA    + EDUARDO - lasix and entresto on hold    for transfer to [facility] for cath. Patient does not want cath here at [facility] and desire to be transferred. Risks of transfer discussed with patient by attending including but not limited to syncope, arrythmia, heart failure and sudden death.                 ·  Problem: NSTEMI (non-ST elevation myocardial infarction).  Plan: EKG with new TWI in V5, V6, I. Troponin uptrending 110-252, also in the setting of EDUARDO. Chest pain at this time improved. Afib rate controlled. Euvolemic on exam. Last echo EF 26% 1/2019. Cath consult rec'd no emergent cath at this time. Now s/p ASA 162mg in ED    -ASA 81mg qd    -Start heparin gtt for NSTEMI management (on home AC, but now with EDUARDO). PTT q6hrs, first should be 12PM    -TTE    -cards f/u regarding cath timing and risk/benefit if EDUARDO resolves    -repeat troponin/CKMB and trend until peaks    -clarify atorvastatin allergy? if true would attempt to have patient take home rosuvastatin if on    -repeat EKG in AM and if worsening signs of ischemia or troponin rising, reconsult cardiology for cath spectra [phone]    -will need full med rec as patient is unaware of all her medications and unable to reach son    -start metoprolol 25 BID for now (appears to previously been on coreg 25 BID but patient denies still being on).         Problem/Plan - 2:    ·  Problem: Renal failure, unspecified chronicity.  Plan: Unclear etiology at this time. Does not appear to be related to patient's CHF as euvolemic on exam. May be prerenal in the setting of vomiting    -repeat BMP s/p 500cc bolus x2    -hold lasix for now    -hold entresto for now pending repeat    -strict I's and O's    -if EDUARDO worsens on repeat, send further urine studies and consult nephrology and order US renal bladder.         Problem/Plan - 3:    ·  Problem: Chronic systolic congestive heart failure.  Plan: Last EF 26% 1/2019. Euvolemic on exam with LE edema or crackles on exam    -hold lasix and entresto given EDUARDO    -probnp    -TTE.         Problem/Plan - 4:    ·  Problem: Type 2 diabetes mellitus with hyperglycemia, without long-term current use of insulin.  Plan: -med rec for DM medications    -hypoglycemic in ED, hold all home oral hypoglycemic agents    -FSG 4x a day.         Problem/Plan - 5:    ·  Problem: Adult Hypothyroidism.  Plan: Clarify home synthroid dosing in AM and order.         Problem/Plan - 6:    Problem: Afib. Plan: -heparin gtt    -metoprolol 25mg BID. 70F w/ hx NSTEMI, thalassemia, asthma, HLD, HFrEF (EF 26%), CAD, DM, HTN, CVA x2, Afib on xarelto? presenting with burning chest pain x10 days. Elevated troponin and signs of lateral ischemic on EKG, treating for NSTEMI. EDUARDO noted as well.         + Chest pain/NSTEMI - On hep gtt, ASA    + EDUARDO - lasix and entresto on hold    for transfer to [facility] for cath. Patient does not want cath here at [facility] and desire to be transferred. Risks of transfer discussed with patient by attending including but not limited to syncope, arrythmia, heart failure and sudden death.     Discussed with the patient and the patient's son (Stoney [phone] who the patient requested I speak to) about her current medical condition.  At this time, the patient and the patient's son do not want any further cardiac testing (i.e ischemic evaluation) here and request transfer to [facility].  I spoke with the patient's cardiologist Dr. Knutson and Dr. Yan who accepted the patient at [place] per patient and patient's family request.                 ·  Problem: NSTEMI (non-ST elevation myocardial infarction).  Plan: EKG with new TWI in V5, V6, I. Troponin uptrending 110-252, also in the setting of EDUARDO. Chest pain at this time improved. Afib rate controlled. Euvolemic on exam. Last echo EF 26% 1/2019. Cath consult rec'd no emergent cath at this time. Now s/p ASA 162mg in ED    -ASA 81mg qd    -Start heparin gtt for NSTEMI management (on home AC, but now with EDUARDO). PTT q6hrs, first should be 12PM    -TTE    -cards f/u regarding cath timing and risk/benefit if EDUARDO resolves    -repeat troponin/CKMB and trend until peaks    -clarify atorvastatin allergy? if true would attempt to have patient take home rosuvastatin if on    -repeat EKG in AM and if worsening signs of ischemia or troponin rising, reconsult cardiology for cath spectra [phone]    -will need full med rec as patient is unaware of all her medications and unable to reach son    -start metoprolol 25 BID for now (appears to previously been on coreg 25 BID but patient denies still being on).         Problem/Plan - 2:    ·  Problem: Renal failure, unspecified chronicity.  Plan: Unclear etiology at this time. Does not appear to be related to patient's CHF as euvolemic on exam. May be prerenal in the setting of vomiting    -repeat BMP s/p 500cc bolus x2    -hold lasix for now    -hold entresto for now pending repeat    -strict I's and O's    -if EDUARDO worsens on repeat, send further urine studies and consult nephrology and order US renal bladder.         Problem/Plan - 3:    ·  Problem: Chronic systolic congestive heart failure.  Plan: Last EF 26% 1/2019. Euvolemic on exam with LE edema or crackles on exam    -hold lasix and entresto given EDUARDO    -probnp    -TTE.         Problem/Plan - 4:    ·  Problem: Type 2 diabetes mellitus with hyperglycemia, without long-term current use of insulin.  Plan: -med rec for DM medications    -hypoglycemic in ED, hold all home oral hypoglycemic agents    -FSG 4x a day.         Problem/Plan - 5:    ·  Problem: Adult Hypothyroidism.  Plan: Clarify home synthroid dosing in AM and order.         Problem/Plan - 6:    Problem: Afib. Plan: -heparin gtt    -metoprolol 25mg BID. 70F w/ hx NSTEMI, thalassemia, asthma, HLD, HFrEF (EF 26%), CAD, DM, HTN, CVA x2, Afib on xarelto? presenting with burning chest pain x10 days. Elevated troponin and signs of lateral ischemic on EKG, treating for NSTEMI. EDUARDO noted as well. Cardiology recommended for Cath however patient indicated she would like to be transferred to [facility] for care under her cardiologist. Patient to be transferred accepting Dr. Yan.         NSTEMI (non-ST elevation myocardial infarction).     EKG with new TWI in V5, V6, I. Troponin uptrending 110-252, also in the setting of EDUARDO. Chest pain at this time improved. Afib rate controlled. Euvolemic on exam. Last echo EF 26% 1/2019. Cath consult rec'd no emergent cath at this time. Now s/p ASA 162mg in ED    -ASA 81mg qd    -heparin gtt for NSTEMI management (on home AC, but now with EDUARDO). PTT q6hrs, first should be 12PM    --TTE 7/31- EF 15-20% Moderate-severe mitral regurgitation. Severe left ventricular enlargement. Severe global left ventricular systolic dysfunction. normal RV function  Moderate tricuspid regurgitation. severe pulmonary hypertension    -cards following     -repeat troponin/CKMB and trend until peaks    -clarify atorvastatin allergy? if true would attempt to have patient take home rosuvastatin if on    -will need full med rec as patient is unaware of all her medications and unable to reach son    -start metoprolol 25 BID for now (appears to previously been on coreg 25 BID but patient denies still being on).         Renal failure, unspecified chronicity.      Unclear etiology at this time. Does not appear to be related to patient's CHF as euvolemic on exam. May be prerenal in the setting of vomiting    -repeat BMP s/p 500cc bolus x2    -hold lasix for now    -hold entresto for now pending repeat    -strict I's and O's    -if EDUARDO worsens on repeat, send further urine studies and consult nephrology and order US renal bladder.         Chronic systolic congestive heart failure.     Last EF 26% 1/2019. Euvolemic on exam with LE edema or crackles on exam    -hold lasix and entresto given EDUARDO    -probnp    -TTE 7/31- EF 15-20% Moderate-severe mitral regurgitation. Severe left ventricular enlargement. Severe global left ventricular systolic dysfunction. normal RV function  Moderate tricuspid regurgitation. severe pulmonary hypertension         Type 2 diabetes mellitus with hyperglycemia, without long-term current use of insulin.      -hypoglycemic in ED, hold all home oral hypoglycemic agents    -FSG 4x a day.         Adult Hypothyroidism.     -home synthroid           Afib.     -heparin gtt    -metoprolol 25mg BID.        Dispo: transfer to [facility]         On 7/31/2020, case was discussed with Dr. Rivers, Cardiology recommended for Cath however patient indicated she would like to be transferred to [facility] for care under her cardiologist. Patient to be transferred accepting Dr. Yan.

## 2020-07-31 NOTE — CHART NOTE - NSCHARTNOTEFT_GEN_A_CORE
Discussed with the patient and the patient's son (Stoney [phone] who the patient requested I speak to) about her current medical condition.  At this time, the patient and the patient's son do not want any further cardiac testing (i.e ischemic evaluation) here and request transfer to [facility].  I spoke with the patient's cardiologist Dr. Knutson and Dr. Yan who accepted the patient at [place] per patient and patient's family request.     Raul Rivers MD

## 2020-07-31 NOTE — ED ADULT NURSE REASSESSMENT NOTE - NS ED NURSE REASSESS COMMENT FT1
Pt heart rate elevates to 130's. MD notified. Repeat EKG to be obtained. Will continue to monitor.
floor called notified pt now dispatched for transport.

## 2020-07-31 NOTE — H&P ADULT - PROBLEM SELECTOR PLAN 1
EKG with new TWI in V5, V6, I. Troponin uptrending 110-252, also in the setting of EDUARDO. Chest pain at this time improved. Afib rate controlled. Euvolemic on exam. Last echo EF 26% 1/2019. Cath consult rec'd no emergent cath at this time. Now s/p ASA 162mg in ED  -ASA 81mg qd  -Start heparin gtt for NSTEMI management (on home AC, but now with EDUARDO). PTT q6hrs, first should be 12PM  -TTE  -cards f/u regarding cath timing and risk/benefit if EDUARDO resolves  -repeat troponin/CKMB and trend until peaks  -clarify atorvastatin allergy? if true would attempt to have patient take home rosuvastatin if on  -repeat EKG in AM and if worsening signs of ischemia or troponin rising, reconsult cardiology for cath spectra [phone]  -will need full med rec as patient is unaware of all her medications and unable to reach son  -start metoprolol 25 BID for now (appears to previously been on coreg 25 BID but patient denies still being on) EKG with new TWI in V5, V6, I. Troponin uptrending 110-252, also in the setting of EDUARDO. Chest pain at this time improved. Afib rate controlled. Euvolemic on exam. Last echo EF 26% 1/2019. Cath consult rec'd no emergent cath at this time. Now s/p ASA 162mg in ED  -ASA 81mg qd  -Start heparin gtt for NSTEMI management (on home AC, but now with EDUARDO). PTT q6hrs, first should be 12PM  -spoke to cardiology and recommended plavix load 600mg  -TTE  -cards f/u regarding cath timing and risk/benefit if EDUARDO resolves  -repeat troponin 700 and CKMB 30, continue to trend to peak  -clarify atorvastatin allergy? if true would attempt to have patient take home rosuvastatin if on  -repeat EKG in AM and if worsening signs of ischemia or troponin rising, reconsult cardiology for cath spectra [phone]  -will need full med rec as patient is unaware of all her medications and unable to reach son  -start metoprolol 25 BID for now (appears to previously been on coreg 25 BID but patient denies still being on)  -spoke to cardiology in AM regarding rising troponin to 700 and evaluated patient at bedside. She is now chest pain free. Private interventional cards notified and plan for likely cath later today. Keep NPO. Call son Stoney Conrad at [phone] as patient would like team to talk to him prior to cath. Was called earlier in the night but unable to reach

## 2020-07-31 NOTE — CONSULT NOTE ADULT - PROBLEM SELECTOR RECOMMENDATION 9
Will start her insulin correction scale coverage and FS testing, will continue monitoring FS and FU
HbA1C pending  will continue to monitor finger sticks with short acting insulin sliding scale  endo help appreciated   finger sticks acceptable for now  abnormal TSH free T4 pending

## 2020-07-31 NOTE — H&P ADULT - PROBLEM SELECTOR PLAN 4
-med rec for DM medications  -hypoglycemic in ED, hold all home oral hypoglycemic agents  -FSG 4x a day

## 2020-07-31 NOTE — CONSULT NOTE ADULT - SUBJECTIVE AND OBJECTIVE BOX
NEPHROLOGY - NSN    Patient seen and examined.    HPI:  70F w/ hx NSTEMI, thalassemia, asthma, HLD, HFrEF (EF 26%), CAD, DM, HTN, CVA x2, Afib on xarelto? presenting with burning chest pain x10 days. Pt states she noticed chest pain that comes and goes for the last ten days, burning in nature. She tried tums without relief. The chest pain is made worse with exertion and relieved with rest at times. Nonradiating, comes and goes and chest pain is improved at this time upon interview. Denies worsens orthopnea (2 pillows at baseline), no PND, no worsened LE edema and has been compliant with CHF medications. Notes some SOB with chest pain but is otherwise at baseline. She is unsure of all her medications but states xarelto was changed to eliquis recently, she is on entresto BID, water pill, synthroid. States she did not take her PM meds prior to coming to hospital. She is unable to mention BP medications or BB. Of note she also had episodes of vomiting today NBNB but did not have abdominal pain or diarrhea, hematochezia.    In ED: rising troponin 110-->252, CKMB 17.46. EKG with signs of lateral ischemia. Given 500cc NS bolus x2, metoprolol IV 2.5mg for afib with RVR and . D50 for hypoglycemia  Cath lab notified and said no emergent cath at this time.    Multiple call attempts to son Stoney Conrad [phone] were unsuccessful. Unable to obtain full medication list. (31 Jul 2020 04:42)      PAST MEDICAL & SURGICAL HISTORY:  NSTEMI (non-ST elevation myocardial infarction)  DJD (degenerative joint disease) of lumbar spine  Dysthymia  Cardiomegaly  Hyperuricemia  Thalassemia  PVD (peripheral vascular disease)  Asthma  GERD (gastroesophageal reflux disease)  Vitamin D deficiency  Hyperlipidemia  MI (myocardial infarction): 1993, 2011  Chronic CHF  Adult Hypothyroidism  CAD (Coronary Artery Disease)  DM (Diabetes Mellitus Screen)  HTN (Hypertension)  CVA (Cerebral Infarction): CVA x 2  2007 and 10/2013  Benign neoplasm of colon: 2010  Stented coronary artery: 2011  H/O: Hysterectomy      MEDICATIONS  (STANDING):  aspirin  chewable 81 milliGRAM(s) Oral daily  clopidogrel Tablet 600 milliGRAM(s) Oral once  dextrose 5%. 1000 milliLiter(s) (50 mL/Hr) IV Continuous <Continuous>  dextrose 50% Injectable 12.5 Gram(s) IV Push once  dextrose 50% Injectable 25 Gram(s) IV Push once  dextrose 50% Injectable 25 Gram(s) IV Push once  heparin  Infusion.  Unit(s)/Hr (8 mL/Hr) IV Continuous <Continuous>  insulin lispro (HumaLOG) corrective regimen sliding scale   SubCutaneous three times a day before meals  insulin lispro (HumaLOG) corrective regimen sliding scale   SubCutaneous at bedtime  metoprolol tartrate 25 milliGRAM(s) Oral two times a day      Allergies    penicillins (Unknown)    Intolerances    ACE inhibitors (Unknown)  Lipitor (Muscle Pain)  metformin (Diarrhea)      SOCIAL HISTORY:  Denies alcohol abuse, drug abuse or tobacco usage.     FAMILY HISTORY:  FH: breast cancer      VITALS:  T(C): 36.7 (07-31-20 @ 09:00), Max: 36.7 (07-30-20 @ 21:46)  HR: 89 (07-31-20 @ 09:00) (77 - 130)  BP: 98/61 (07-31-20 @ 09:00) (93/63 - 132/82)  RR: 17 (07-31-20 @ 09:00) (17 - 22)  SpO2: 99% (07-31-20 @ 09:00) (97% - 100%)    REVIEW OF SYSTEMS:  Denies any nausea, vomiting, diarrhea, fever or chills. Denies chest pain, SOB, focal weakness, hematuria or dysuria. Good oral intake and denies fatigue or weakness. All other pertinent systems are reviewed and are negative.    PHYSICAL EXAM:  Constitutional: NAD  HEENT: EOMI  Neck:  No JVD, supple   Respiratory: CTA B/L  Cardiovascular: S1 and S2, RRR  Gastrointestinal: + BS, soft, NT, ND  Extremities: No peripheral edema, + peripheral pulses  Neurological: A/O x 3, CN2-12 intact  Psychiatric: Normal mood, normal affect  : No Hercules  Skin: No rashes, C/D/I  Access: Not applicable    I and O's:      Weight (kg): 69.8 (07-31 @ 00:44)    LABS:                        12.2   11.34 )-----------( 122      ( 30 Jul 2020 21:40 )             39.2     07-31    139  |  104  |  52<H>  ----------------------------<  202<H>  4.5   |  18<L>  |  2.52<H>    Ca    9.1      31 Jul 2020 06:18  Phos  3.2     07-30  Mg     2.3     07-30    TPro  7.8  /  Alb  4.2  /  TBili  0.4  /  DBili  x   /  AST  26  /  ALT  10  /  AlkPhos  69  07-30      URINE:      RADIOLOGY & ADDITIONAL STUDIES:   < from: Xray Chest 1 View-PORTABLE IMMEDIATE (07.30.20 @ 23:03) >    EXAM:  XR CHEST PORTABLE IMMED 1V        PROCEDURE DATE:  Jul 30 2020         INTERPRETATION:  EXAMINATION: XR CHEST IMMEDIATE    CLINICAL INDICATION: Chest pain.    TECHNIQUE: Single frontal view of the chest was obtained.    COMPARISON: Chest x-ray 1/24/2019.    FINDINGS:    Left-sided AICD. The heart is normal in size.    The lungs are clear. Right upper lobe calcified granuloma. No pneumothorax. No pleural effusion.    Degenerative changes of the spine. No acute osseous abnormalities.    IMPRESSION:  Clear lungs.            GOLDIE POPE M.D., RADIOLGY RESIDENT  This document has been electronically signed.  LISANDRO CAMPUZANO M.D., ATTENDING RADIOLOGIST  This document has been electronically signed. Jul 31 2020  9:15AM                  < end of copied text > NEPHROLOGY - NSN    Patient seen and examined.    HPI:  70F w/ hx NSTEMI, thalassemia, asthma, HLD, HFrEF (EF 26%), CAD, DM, HTN, CVA x2, Afib on xarelto? presenting with burning chest pain x10 days. Pt states she noticed chest pain that comes and goes for the last ten days, burning in nature. She tried tums without relief. The chest pain is made worse with exertion and relieved with rest at times. Nonradiating, comes and goes and chest pain is improved at this time upon interview. Denies worsens orthopnea (2 pillows at baseline), no PND, no worsened LE edema and has been compliant with CHF medications. Notes some SOB with chest pain but is otherwise at baseline. She is unsure of all her medications but states xarelto was changed to eliquis recently, she is on entresto BID, water pill, synthroid. States she did not take her PM meds prior to coming to hospital. She is unable to mention BP medications or BB. Of note she also had episodes of vomiting today NBNB but did not have abdominal pain or diarrhea, hematochezia.    In ED: rising troponin 110-->252, CKMB 17.46. EKG with signs of lateral ischemia. Given 500cc NS bolus x2, metoprolol IV 2.5mg for afib with RVR and . D50 for hypoglycemia  Cath lab notified and said no emergent cath at this time.  Pt states that recently she got cardiomems placed?  There has been no manipulations on the entresto recently and she denies any new medications.  No more vomiting since this am  She has had a CVA in the past with residual deficits.  She denies prior hx of EDUARDO  There is no hematuria or bubbles in the urine.  No history of NSAIDS or nephrolithisis.  The patient urinates once or twice in the night and there is no incontinence.  No family hx or renal disease or back pain.    No recent abx use.  No alleviating or aggravating factors with respect to the kidneys.     PAST MEDICAL & SURGICAL HISTORY:  NSTEMI (non-ST elevation myocardial infarction)  DJD (degenerative joint disease) of lumbar spine  Dysthymia  Cardiomegaly  Hyperuricemia  Thalassemia  PVD (peripheral vascular disease)  Asthma  GERD (gastroesophageal reflux disease)  Vitamin D deficiency  Hyperlipidemia  MI (myocardial infarction): 1993, 2011  Chronic CHF  Adult Hypothyroidism  CAD (Coronary Artery Disease)  DM (Diabetes Mellitus Screen)  HTN (Hypertension)  CVA (Cerebral Infarction): CVA x 2  2007 and 10/2013  Benign neoplasm of colon: 2010  Stented coronary artery: 2011  H/O: Hysterectomy      MEDICATIONS  (STANDING):  aspirin  chewable 81 milliGRAM(s) Oral daily  clopidogrel Tablet 600 milliGRAM(s) Oral once  dextrose 5%. 1000 milliLiter(s) (50 mL/Hr) IV Continuous <Continuous>  dextrose 50% Injectable 12.5 Gram(s) IV Push once  dextrose 50% Injectable 25 Gram(s) IV Push once  dextrose 50% Injectable 25 Gram(s) IV Push once  heparin  Infusion.  Unit(s)/Hr (8 mL/Hr) IV Continuous <Continuous>  insulin lispro (HumaLOG) corrective regimen sliding scale   SubCutaneous three times a day before meals  insulin lispro (HumaLOG) corrective regimen sliding scale   SubCutaneous at bedtime  metoprolol tartrate 25 milliGRAM(s) Oral two times a day      Allergies    penicillins (Unknown)    Intolerances    ACE inhibitors (Unknown)  Lipitor (Muscle Pain)  metformin (Diarrhea)      SOCIAL HISTORY:  Denies alcohol abuse, drug abuse or tobacco usage.     FAMILY HISTORY:  FH: breast cancer      VITALS:  T(C): 36.7 (07-31-20 @ 09:00), Max: 36.7 (07-30-20 @ 21:46)  HR: 89 (07-31-20 @ 09:00) (77 - 130)  BP: 98/61 (07-31-20 @ 09:00) (93/63 - 132/82)  RR: 17 (07-31-20 @ 09:00) (17 - 22)  SpO2: 99% (07-31-20 @ 09:00) (97% - 100%)    REVIEW OF SYSTEMS:   Denies chest pain, SOB, focal weakness, hematuria or dysuria. + fatigue or weakness. All other pertinent systems are reviewed and are negative.    PHYSICAL EXAM:  Constitutional: NAD  HEENT: EOMI  Neck:  No JVD, supple   Respiratory: CTA B/L  Cardiovascular: S1 and S2, RRR  Gastrointestinal: + BS, soft, NT, ND  Extremities: No peripheral edema, + peripheral pulses  Neurological: A/O x 3, CN2-12 intact  Psychiatric: Normal mood, normal affect  : No Hercules  Skin: No rashes, C/D/I  Access: Not applicable    I and O's:      Weight (kg): 69.8 (07-31 @ 00:44)    LABS:                        12.2   11.34 )-----------( 122      ( 30 Jul 2020 21:40 )             39.2     07-31    139  |  104  |  52<H>  ----------------------------<  202<H>  4.5   |  18<L>  |  2.52<H>    Ca    9.1      31 Jul 2020 06:18  Phos  3.2     07-30  Mg     2.3     07-30    TPro  7.8  /  Alb  4.2  /  TBili  0.4  /  DBili  x   /  AST  26  /  ALT  10  /  AlkPhos  69  07-30      URINE:      RADIOLOGY & ADDITIONAL STUDIES:   < from: Xray Chest 1 View-PORTABLE IMMEDIATE (07.30.20 @ 23:03) >    EXAM:  XR CHEST PORTABLE IMMED 1V        PROCEDURE DATE:  Jul 30 2020         INTERPRETATION:  EXAMINATION: XR CHEST IMMEDIATE    CLINICAL INDICATION: Chest pain.    TECHNIQUE: Single frontal view of the chest was obtained.    COMPARISON: Chest x-ray 1/24/2019.    FINDINGS:    Left-sided AICD. The heart is normal in size.    The lungs are clear. Right upper lobe calcified granuloma. No pneumothorax. No pleural effusion.    Degenerative changes of the spine. No acute osseous abnormalities.    IMPRESSION:  Clear lungs.            GOLDIE POPE M.D., RADIOLGY RESIDENT  This document has been electronically signed.  LISANDRO CAMPUZANO M.D., ATTENDING RADIOLOGIST  This document has been electronically signed. Jul 31 2020  9:15AM                  < end of copied text >

## 2020-07-31 NOTE — CONSULT NOTE ADULT - SUBJECTIVE AND OBJECTIVE BOX
HISTORY OF PRESENT ILLNESS: HPI:  Patient is a 71 y/o Female with PMH of HTN, HLD, DM2, CAD s/p stents, ICM combined Biv CHF (EF 26% in 2019) s/p single lead ICD, hypothyroidism, Afib on xarelto, CVA (2007, 2013) c/b residual mild left hemiparesis and moderate expressive aphasia, GERD presented with burning chest pain admitted with NSTEMI. Patient reports burning chest pain worse with exertion at times that was not relieved with antacids or change in diet. Denies SOB, LE edema, KRAUS, palpitations, dizziness, syncope, abd pain, back pain, fever/chills.    PAST MEDICAL & SURGICAL HISTORY:  NSTEMI (non-ST elevation myocardial infarction)  DJD (degenerative joint disease) of lumbar spine  Dysthymia  Cardiomegaly  Hyperuricemia  Thalassemia  PVD (peripheral vascular disease)  Asthma  GERD (gastroesophageal reflux disease)  Vitamin D deficiency  Hyperlipidemia  MI (myocardial infarction): 1993, 2011  Chronic CHF  Adult Hypothyroidism  CAD (Coronary Artery Disease)  DM (Diabetes Mellitus Screen)  HTN (Hypertension)  CVA (Cerebral Infarction): CVA x 2  2007 and 10/2013  Benign neoplasm of colon: 2010  Stented coronary artery: 2011  H/O: Hysterectomy      MEDICATIONS:  MEDICATIONS  (STANDING):  aspirin  chewable 81 milliGRAM(s) Oral daily  dextrose 5%. 1000 milliLiter(s) (50 mL/Hr) IV Continuous <Continuous>  dextrose 50% Injectable 12.5 Gram(s) IV Push once  dextrose 50% Injectable 25 Gram(s) IV Push once  dextrose 50% Injectable 25 Gram(s) IV Push once  heparin  Infusion.  Unit(s)/Hr (8 mL/Hr) IV Continuous <Continuous>  insulin lispro (HumaLOG) corrective regimen sliding scale   SubCutaneous three times a day before meals  insulin lispro (HumaLOG) corrective regimen sliding scale   SubCutaneous at bedtime  metoprolol tartrate 25 milliGRAM(s) Oral two times a day      Allergies    penicillins (Unknown)    Intolerances    ACE inhibitors (Unknown)  Lipitor (Muscle Pain)  metformin (Diarrhea)      FAMILY HISTORY:  FH: breast cancer    Non-contributary for premature coronary disease or sudden cardiac death    SOCIAL HISTORY:    [x ] Non-smoker  [ ] Smoker  [ ] Alcohol    FLU VACCINE THIS YEAR STARTS IN AUGUST:  [ ] Yes    [ ] No    IF OVER 65 HAVE YOU EVER HAD A PNA VACCINE:  [ ] Yes    [ ] No       [ ] N/A      REVIEW OF SYSTEMS:  [x ]chest pain  [  ]shortness of breath  [  ]palpitations  [  ]syncope  [ ]near syncope [ ]upper extremity weakness   [ ] lower extremity weakness  [  ]diplopia  [  ]altered mental status   [  ]fevers  [ ]chills [ ]nausea  [ ]vomitting  [  ]dysphagia    [ ]abdominal pain  [ ]melena  [ ]BRBPR    [  ]epistaxis  [  ]rash    [ ]lower extremity edema        [x ] All others negative	  [ ] Unable to obtain      LABS:	 	    CARDIAC MARKERS:  CARDIAC MARKERS ( 31 Jul 2020 06:18 )  x     / x     / 310 u/L / 29.89 ng/mL / x      CARDIAC MARKERS ( 30 Jul 2020 23:50 )  x     / x     / 178 u/L / 17.46 ng/mL / x                                  12.2   11.34 )-----------( 122      ( 30 Jul 2020 21:40 )             39.2     Hb Trend: 12.2<--    07-31    139  |  104  |  52<H>  ----------------------------<  202<H>  4.5   |  18<L>  |  2.52<H>    Ca    9.1      31 Jul 2020 06:18  Phos  3.2     07-30  Mg     2.3     07-30    TPro  7.8  /  Alb  4.2  /  TBili  0.4  /  DBili  x   /  AST  26  /  ALT  10  /  AlkPhos  69  07-30    Creatinine Trend: 2.52<--, 2.60<--    Coags:  PT/INR - ( 30 Jul 2020 21:40 )   PT: 14.2 SEC;   INR: 1.26          PTT - ( 30 Jul 2020 21:40 )  PTT:31.5 SEC    proBNP: Serum Pro-Brain Natriuretic Peptide: 6022 pg/mL (07-31 @ 06:18)    Lipid Profile:   HgA1c:   TSH: Thyroid Stimulating Hormone, Serum: 0.16 uIU/mL (07-30 @ 23:50)  Thyroid Stimulating Hormone, Serum: 0.16 uIU/mL (07-30 @ 23:50)          PHYSICAL EXAM:  T(C): 36.7 (07-31-20 @ 09:00), Max: 36.7 (07-30-20 @ 21:46)  HR: 89 (07-31-20 @ 09:00) (77 - 130)  BP: 98/61 (07-31-20 @ 09:00) (93/63 - 132/82)  RR: 17 (07-31-20 @ 09:00) (17 - 22)  SpO2: 99% (07-31-20 @ 09:00) (97% - 100%)  Wt(kg): --     I&O's Summary      Gen: Appears well in NAD  HEENT:  (-)icterus (-)pallor  CV: N S1 S2 1/6 ERICK (+)2 Pulses B/l  Resp:  Clear to ausculatation B/L, normal effort  GI: (+) BS Soft, NT, ND  Lymph:  (-)Edema, (-)obvious lymphadenopathy  Skin: Warm to touch, Normal turgor  Psych: Appropriate mood and affect      TELEMETRY: AF 60-70s overall, brief to 120	      ECG: Afib, inferolateral TWI, septal infarct age undetermined 	    Echo: Pending    RADIOLOGY:         CXR: < from: Xray Chest 1 View-PORTABLE IMMEDIATE (07.30.20 @ 23:03) >  IMPRESSION:  Clear lungs.    < end of copied text >      ASSESSMENT/PLAN: Patient is a 71 y/o Female with PMH of HTN, HLD, DM2, CAD s/p stents, ICM combined Biv CHF (EF 26% in 2019) s/p single lead ICD, hypothyroidism, Afib on xarelto, CVA (2007, 2013) c/b residual mild left hemiparesis and moderate expressive aphasia, GERD presented with burning chest pain admitted with NSTEMI.    - Currently chest pain free  - Trend enzymes until peak  - Continue hep gtt for ACS and CVA prevention/Afib  - Continue ASA, s/p plavix load, metoprolol  - Check TTE to eval LV function and for wall motion abnormalities  - Renal consult called for EDUARDO (Entresto and lasix on hold)  - Endocrine consult appreciated for low TSH  - Will speak further with patient's son as requested by patient  - Plan for ischemic eval when medically stable    Clifton Garcia PA-C  Pager: [phone]

## 2020-07-31 NOTE — DISCHARGE NOTE PROVIDER - NSDCFUSCHEDAPPT_GEN_ALL_CORE_FT
AMBERLY CAST ; 08/13/2020 ; [facility] [address] [facility] AMBERLY CAST ; 08/13/2020 ; [facility] [address] [facility] AMBERLY CAST ; 08/13/2020 ; [facility] [address] [facility] AMBERLY CAST ; 08/13/2020 ; NPP Med End[facility] [address] [facility]

## 2020-07-31 NOTE — H&P ADULT - HISTORY OF PRESENT ILLNESS
[age]F w/ hx NSTEMI, thalassemia, asthma, HLD, HFrEF (EF 26%), CAD, DM, HTN, CVA x2, Afib on xarelto? presenting with burning chest pain x10 days. Pt states she noticed chest pain that comes and goes for the last ten days, burning in nature. She tried tums without relief. The chest pain is made worse with exertion and relieved with rest at times. Nonradiating, comes and goes and chest pain is improved at this time upon interview. Denies worsens orthopnea (2 pillows at baseline), no PND, no worsened LE edema and has been compliant with CHF medications. Notes some SOB with chest pain but is otherwise at baseline. She is unsure of all her medications but states xarelto was changed to eliquis recently, she is on entresto BID, water pill, synthroid. States she did not take her PM meds prior to coming to hospital. She is unable to mention BP medications or BB. Of note she also had episodes of vomiting today NBNB but did not have abdominal pain or diarrhea, hematochezia.    In ED: rising troponin 110-->252, CKMB 17.46. EKG with signs of lateral ischemia. Given 500cc NS bolus x2, metoprolol IV 2.5mg for afib with RVR and . D50 for hypoglycemia  Cath lab notified and said no emergent cath at this time. 70F w/ hx NSTEMI, thalassemia, asthma, HLD, HFrEF (EF 26%), CAD, DM, HTN, CVA x2, Afib on xarelto? presenting with burning chest pain x10 days. Pt states she noticed chest pain that comes and goes for the last ten days, burning in nature. She tried tums without relief. The chest pain is made worse with exertion and relieved with rest at times. Nonradiating, comes and goes and chest pain is improved at this time upon interview. Denies worsens orthopnea (2 pillows at baseline), no PND, no worsened LE edema and has been compliant with CHF medications. Notes some SOB with chest pain but is otherwise at baseline. She is unsure of all her medications but states xarelto was changed to eliquis recently, she is on entresto BID, water pill, synthroid. States she did not take her PM meds prior to coming to hospital. She is unable to mention BP medications or BB. Of note she also had episodes of vomiting today NBNB but did not have abdominal pain or diarrhea, hematochezia.    In ED: rising troponin 110-->252, CKMB 17.46. EKG with signs of lateral ischemia. Given 500cc NS bolus x2, metoprolol IV 2.5mg for afib with RVR and . D50 for hypoglycemia  Cath lab notified and said no emergent cath at this time.    Multiple call attempts to son Stoney Conrad [phone] were unsuccessful. Unable to obtain full medication list.

## 2020-07-31 NOTE — CONSULT NOTE ADULT - PROBLEM SELECTOR PROBLEM 4
Coronary artery disease of native artery of native heart with stable angina pectoris Atrial fibrillation, unspecified type

## 2020-07-31 NOTE — H&P ADULT - PROBLEM SELECTOR PLAN 2
Unclear etiology at this time. Does not appear to be related to patient's CHF as euvolemic on exam. May be prerenal in the setting of vomiting  -repeat BMP s/p 500cc bolus x2  -hold lasix for now  -hold entresto for now pending repeat  -strict I's and O's  -if EDUARDO worsens on repeat, send further urine studies and consult nephrology and order US renal bladder

## 2020-07-31 NOTE — CONSULT NOTE ADULT - SUBJECTIVE AND OBJECTIVE BOX
Patient is a 70y old  Female who presents with a chief complaint of burning chest pain x10 days (31 Jul 2020 11:13)      HPI:  70F w/ hx NSTEMI, thalassemia, asthma, HLD, HFrEF (EF 26%), CAD, DM, HTN, CVA x2, Afib presenting with burning chest pain x10 days. Pt states she noticed chest pain that comes and goes for the last ten days, burning in nature. She tried tums without relief. The chest pain is made worse with exertion and relieved with rest at times and describes it as nonradiating and intermittent. Denies worsens orthopnea (2 pillows at baseline), no PND, no worsened LE edema and has been compliant with CHF medications. Notes some SOB with chest pain but is otherwise at baseline.      PAST MEDICAL & SURGICAL HISTORY:  NSTEMI (non-ST elevation myocardial infarction)  DJD (degenerative joint disease) of lumbar spine  Dysthymia  Cardiomegaly  Hyperuricemia  Thalassemia  PVD (peripheral vascular disease)  Asthma  GERD (gastroesophageal reflux disease)  Vitamin D deficiency  Hyperlipidemia  MI (myocardial infarction): 1993, 2011  Chronic CHF  Adult Hypothyroidism  CAD (Coronary Artery Disease)  DM (Diabetes Mellitus Screen)  HTN (Hypertension)  CVA (Cerebral Infarction): CVA x 2  2007 and 10/2013  Benign neoplasm of colon: 2010  Stented coronary artery: 2011  H/O: Hysterectomy      Review of Systems:   CONSTITUTIONAL: No fever, weight loss, or fatigue  EYES: No eye pain, visual disturbances, or discharge  ENMT:  No difficulty hearing, tinnitus, vertigo; No sinus or throat pain  NECK: No pain or stiffness  RESPIRATORY: No cough, wheezing, chills or hemoptysis; No shortness of breath  CARDIOVASCULAR: see above HPI   GASTROINTESTINAL: No abdominal pain. No nausea, vomiting, diarrhea or constipation  GENITOURINARY: No dysuria, frequency, hematuria, or incontinence  NEUROLOGICAL: No headaches, memory loss, loss of strength, numbness, or tremors  SKIN: No itching, burning, rashes, or lesions   LYMPH NODES: No enlarged glands  ENDOCRINE: No heat or cold intolerance; No hair loss  MUSCULOSKELETAL: No joint pain or swelling; No muscle, back, or extremity pain  PSYCHIATRIC: No depression, anxiety, mood swings, or difficulty sleeping  HEME/LYMPH: No easy bruising, or bleeding gums  ALLERGY AND IMMUNOLOGIC: No hives or eczema    Allergies    penicillins (Unknown)    Intolerances    ACE inhibitors (Unknown)  Lipitor (Muscle Pain)  metformin (Diarrhea)      Social History: non smoker  no IVDA  no ETOH abuse   lives with family    FAMILY HISTORY:  FH: breast cancer      MEDICATIONS  (STANDING):  aspirin  chewable 81 milliGRAM(s) Oral daily  dextrose 5%. 1000 milliLiter(s) (50 mL/Hr) IV Continuous <Continuous>  dextrose 50% Injectable 12.5 Gram(s) IV Push once  dextrose 50% Injectable 25 Gram(s) IV Push once  dextrose 50% Injectable 25 Gram(s) IV Push once  heparin  Infusion.  Unit(s)/Hr (8 mL/Hr) IV Continuous <Continuous>  insulin lispro (HumaLOG) corrective regimen sliding scale   SubCutaneous three times a day before meals  insulin lispro (HumaLOG) corrective regimen sliding scale   SubCutaneous at bedtime  metoprolol tartrate 25 milliGRAM(s) Oral two times a day    MEDICATIONS  (PRN):  dextrose 40% Gel 15 Gram(s) Oral once PRN Blood Glucose LESS THAN 70 milliGRAM(s)/deciLiter  glucagon  Injectable 1 milliGRAM(s) IntraMuscular once PRN Glucose <70 milliGRAM(s)/deciLiter  heparin   Injectable 4100 Unit(s) IV Push every 6 hours PRN For aPTT less than 40      CAPILLARY BLOOD GLUCOSE      POCT Blood Glucose.: 160 mg/dL (31 Jul 2020 00:52)  POCT Blood Glucose.: 107 mg/dL (30 Jul 2020 22:40)  POCT Blood Glucose.: 126 mg/dL (30 Jul 2020 21:55)  POCT Blood Glucose.: 96 mg/dL (30 Jul 2020 21:41)  POCT Blood Glucose.: 77 mg/dL (30 Jul 2020 21:29)  POCT Blood Glucose.: 68 mg/dL (30 Jul 2020 20:47)  POCT Blood Glucose.: 74 mg/dL (30 Jul 2020 19:32)    I&O's Summary      24hrs Vital:  T(C): 36.7 (07-31-20 @ 09:00), Max: 36.7 (07-30-20 @ 21:46)  HR: 89 (07-31-20 @ 09:00) (77 - 130)  BP: 98/61 (07-31-20 @ 09:00) (93/63 - 132/82)  RR: 17 (07-31-20 @ 09:00) (17 - 22)  SpO2: 99% (07-31-20 @ 09:00) (97% - 100%)    PHYSICAL EXAM:  GENERAL: NAD  HEAD:  Atraumatic, Normocephalic  EYES: EOMI, PERRLA, conjunctiva and sclera clear  NECK: Supple, No JVD  CHEST/LUNG: Clear to auscultation bilaterally;   decreased breath sounds at bases   HEART: S1S2; No rubs, or gallops, no murmurs  ABDOMEN: Soft, Nontender; Bowel sounds present  EXTREMITIES:  + Peripheral Pulses, No clubbing or cyanosis, no edema  PSYCH: AO x 3, anxious affect  NEUROLOGY: Alert, no focal motor or sensory deficits  SKIN: No rashes or lesions    LABS:                        12.2   11.34 )-----------( 122      ( 30 Jul 2020 21:40 )             39.2     07-31    139  |  104  |  52<H>  ----------------------------<  202<H>  4.5   |  18<L>  |  2.52<H>    Ca    9.1      31 Jul 2020 06:18  Phos  3.2     07-30  Mg     2.3     07-30    TPro  7.8  /  Alb  4.2  /  TBili  0.4  /  DBili  x   /  AST  26  /  ALT  10  /  AlkPhos  69  07-30    PT/INR - ( 30 Jul 2020 21:40 )   PT: 14.2 SEC;   INR: 1.26          PTT - ( 30 Jul 2020 21:40 )  PTT:31.5 SEC  CARDIAC MARKERS ( 31 Jul 2020 06:18 )  x     / x     / 310 u/L / 29.89 ng/mL / x      CARDIAC MARKERS ( 30 Jul 2020 23:50 )  x     / x     / 178 u/L / 17.46 ng/mL / x              RADIOLOGY & ADDITIONAL TESTS:    Consultant(s) Notes Reviewed:      Care Discussed with Consultants/Other Providers:

## 2020-07-31 NOTE — CONSULT NOTE ADULT - REASON FOR ADMISSION
burning chest pain x10 days

## 2020-07-31 NOTE — CONSULT NOTE ADULT - PROBLEM SELECTOR RECOMMENDATION 3
Continue treatment, GI Primary team FU
chest pain free  on IV heparin  continue to monitor on IV heparin

## 2020-07-31 NOTE — PATIENT PROFILE ADULT - BRADEN SCORE
21 Solaraze Pregnancy And Lactation Text: This medication is Pregnancy Category B and is considered safe. There is some data to suggest avoiding during the third trimester. It is unknown if this medication is excreted in breast milk.

## 2020-07-31 NOTE — CONSULT NOTE ADULT - PROBLEM SELECTOR PROBLEM 1
Type 2 diabetes mellitus with hyperglycemia, without long-term current use of insulin
Type 2 diabetes mellitus with other circulatory complication, without long-term current use of insulin

## 2020-08-13 ENCOUNTER — APPOINTMENT (OUTPATIENT)
Dept: ENDOCRINOLOGY | Facility: CLINIC | Age: 70
End: 2020-08-13
Payer: MEDICARE

## 2020-08-13 VITALS
DIASTOLIC BLOOD PRESSURE: 80 MMHG | OXYGEN SATURATION: 98 % | HEIGHT: 61.9 IN | TEMPERATURE: 97.3 F | SYSTOLIC BLOOD PRESSURE: 126 MMHG | HEART RATE: 59 BPM | WEIGHT: 151 LBS | BODY MASS INDEX: 27.79 KG/M2

## 2020-08-13 PROCEDURE — 99214 OFFICE O/P EST MOD 30 MIN: CPT

## 2020-08-13 RX ORDER — CARVEDILOL 25 MG/1
25 TABLET, FILM COATED ORAL
Qty: 180 | Refills: 0 | Status: DISCONTINUED | COMMUNITY
Start: 2017-12-13 | End: 2020-08-13

## 2020-08-13 RX ORDER — METOPROLOL SUCCINATE 25 MG/1
25 TABLET, EXTENDED RELEASE ORAL
Qty: 90 | Refills: 1 | Status: ACTIVE | COMMUNITY
Start: 2020-08-13

## 2020-08-13 RX ORDER — RANOLAZINE 500 MG/1
500 TABLET, EXTENDED RELEASE ORAL
Qty: 60 | Refills: 0 | Status: ACTIVE | COMMUNITY
Start: 2020-08-13

## 2020-08-13 RX ORDER — CLOPIDOGREL BISULFATE 75 MG/1
75 TABLET, FILM COATED ORAL
Qty: 90 | Refills: 0 | Status: ACTIVE | COMMUNITY
Start: 2020-08-13

## 2020-08-13 RX ORDER — TORSEMIDE 100 MG/1
100 TABLET ORAL DAILY
Qty: 30 | Refills: 0 | Status: ACTIVE | COMMUNITY
Start: 2020-08-13

## 2020-08-13 RX ORDER — BUDESONIDE 0.25 MG/2ML
0.25 INHALANT ORAL TWICE DAILY
Qty: 1 | Refills: 0 | Status: DISCONTINUED | COMMUNITY
Start: 2019-11-21 | End: 2020-08-13

## 2020-08-13 RX ORDER — POTASSIUM CHLORIDE 750 MG/1
10 CAPSULE, EXTENDED RELEASE ORAL DAILY
Qty: 60 | Refills: 0 | Status: ACTIVE | COMMUNITY
Start: 2020-08-13

## 2020-08-13 RX ORDER — SACUBITRIL AND VALSARTAN 97; 103 MG/1; MG/1
97-103 TABLET, FILM COATED ORAL
Refills: 0 | Status: DISCONTINUED | COMMUNITY
Start: 2017-11-17 | End: 2020-08-13

## 2020-08-13 RX ORDER — ALPRAZOLAM 0.25 MG/1
0.25 TABLET ORAL
Qty: 30 | Refills: 0 | Status: DISCONTINUED | COMMUNITY
Start: 2019-11-21 | End: 2020-08-13

## 2020-08-13 RX ORDER — HYDRALAZINE HYDROCHLORIDE AND ISOSORBIDE DINITRATE 37.5; 2 MG/1; MG/1
20-37.5 TABLET, FILM COATED ORAL 3 TIMES DAILY
Qty: 90 | Refills: 0 | Status: ACTIVE | COMMUNITY
Start: 2020-08-13

## 2020-08-19 NOTE — ADDENDUM
[FreeTextEntry1] : 8/19 at 225pm: tried to reach patient to let her know that her labs were good but her vm is not set-up on her cell phone and her home phone was busy. Will ask MOA to send her a note.

## 2020-08-19 NOTE — PHYSICAL EXAM
[Alert] : alert [Well Nourished] : well nourished [No Acute Distress] : no acute distress [Normal Sclera/Conjunctiva] : normal sclera/conjunctiva [No Proptosis] : no proptosis [No Respiratory Distress] : no respiratory distress [Normal Rate and Effort] : normal respiratory rate and effort [Clear to Auscultation] : lungs were clear to auscultation bilaterally [Normal Rate] : heart rate was normal [Normal S1, S2] : normal S1 and S2 [Regular Rhythm] : with a regular rhythm [Normal Bowel Sounds] : normal bowel sounds [Not Tender] : non-tender [Soft] : abdomen soft [Right Foot Was Examined] : right foot ~C was examined [Left Foot Was Examined] : left foot ~C was examined [Normal] : normal [2+] : 2+ in the dorsalis pedis [#7 Diminished] : number 7 was diminished [#9 Diminished] : number 9 was diminished [#8 Diminished] : number 8 was diminished [#5 Diminished] : number 5 was normal [#4 Diminished] : number 4 was normal [#6 Diminished] : number 6 was normal [#1 Diminished] : number 1 was normal [#2 Diminished] : number 2 was normal [#3 Diminished] : number 3 was normal [#10 Diminished] : number 10 was normal

## 2020-08-19 NOTE — ASSESSMENT
[FreeTextEntry1] : 69 yo female with hx T2DM with cva, microalbumin and chf s/p ICD here for f/u.\par 1) T2DM: FBS at goal, continue prandin 2mg with breakfast AND dinner. \par -check bs qdaily\par -HbA1C at goal between 7 and 8%.\par -Due for optho\par 2) Hypothyroidism: TSH goal: 0.2-3. Continue Synthroid.\par 3) HTN: continue current regimen, at goal, less than 140/90. \par 4) Dyslipidemia: statin tx, Crestor 20mg po qhs.\par DXA due after Sept 2020.\par Labs from [facility] doid not arrive until after she left. Her urine microalbumin:cr was normal range. There was no TSH or HbA1c so called her PCP and asked him to check it as he is seeing her this afternoon. He agreed to do so.\par RTC in 6 months\par CC: Dr. Esqueda and Dr. Patel.

## 2020-08-19 NOTE — HISTORY OF PRESENT ILLNESS
[FreeTextEntry1] : 69 yo female with hx of t2dm controlled (HbA1c %) x 30 years with neuropathy, microalbuminuria, cad and cva here for f/u. She was recently hospitalized at [facility], discharged on 8/4/2020. Her medications were adjusted - stopped entresto and coreg, restarted metoprolol and plavix. Pt takes Prandin 2mg po with b'fast and dinner skips at lunch as she only has a salad. \par She continues to have issues with her memory and word finding which is chronic from her past CVA. Her sob issues have resolved after a recent hospitalization at [facility]. She will resume cardiac rehab in 2 weeks. \par \par BS - done in the morning\par 8/13 am-124\par 8/12 am-124\par 8/11 am-138\par 8/10 am-97\par 8/9 am-95\par 8/8 am-98\par 8/7 am-99\par 8/6 am-107\par 8/5 am-185\par \par PCP: Dr. Fields\par Cards: Dr. Patel\par ENT: Dr. Veliz\par Pulmonary: Dr. Ceron\par CHF: Dr. Swapna Knutson

## 2020-11-12 ENCOUNTER — APPOINTMENT (OUTPATIENT)
Dept: ENDOCRINOLOGY | Facility: CLINIC | Age: 70
End: 2020-11-12
Payer: MEDICARE

## 2020-11-12 ENCOUNTER — RESULT CHARGE (OUTPATIENT)
Age: 70
End: 2020-11-12

## 2020-11-12 VITALS
HEIGHT: 61 IN | BODY MASS INDEX: 28.89 KG/M2 | TEMPERATURE: 97.3 F | DIASTOLIC BLOOD PRESSURE: 78 MMHG | HEART RATE: 97 BPM | SYSTOLIC BLOOD PRESSURE: 130 MMHG | WEIGHT: 153 LBS | OXYGEN SATURATION: 98 %

## 2020-11-12 LAB
GLUCOSE BLDC GLUCOMTR-MCNC: 96
HBA1C MFR BLD HPLC: 7.9

## 2020-11-12 PROCEDURE — 77085 DXA BONE DENSITY AXL VRT FX: CPT

## 2020-11-12 PROCEDURE — ZZZZZ: CPT

## 2020-11-12 PROCEDURE — 99072 ADDL SUPL MATRL&STAF TM PHE: CPT

## 2020-11-12 PROCEDURE — 82962 GLUCOSE BLOOD TEST: CPT

## 2020-11-12 PROCEDURE — 99214 OFFICE O/P EST MOD 30 MIN: CPT | Mod: 25

## 2020-11-12 RX ORDER — BLOOD SUGAR DIAGNOSTIC
STRIP MISCELLANEOUS
Qty: 1 | Refills: 3 | Status: ACTIVE | COMMUNITY
Start: 2018-06-20 | End: 1900-01-01

## 2020-11-12 RX ORDER — MIRTAZAPINE 7.5 MG/1
7.5 TABLET, FILM COATED ORAL
Qty: 30 | Refills: 3 | Status: ACTIVE | COMMUNITY
Start: 2020-11-12 | End: 1900-01-01

## 2020-11-12 RX ORDER — SYRING-NEEDL,DISP,INSUL,0.3 ML 30 GX5/16"
SYRINGE, EMPTY DISPOSABLE MISCELLANEOUS
Qty: 1 | Refills: 0 | Status: DISCONTINUED | COMMUNITY
Start: 2018-06-20 | End: 2020-11-12

## 2020-11-12 RX ORDER — LANCETS
EACH MISCELLANEOUS
Qty: 1 | Refills: 3 | Status: ACTIVE | COMMUNITY
Start: 2018-06-20 | End: 1900-01-01

## 2020-11-12 RX ORDER — INSULIN PUMP/INFUS. SET/METER
KIT MISCELLANEOUS
Qty: 1 | Refills: 0 | Status: ACTIVE | COMMUNITY
Start: 2020-11-12 | End: 1900-01-01

## 2020-11-12 NOTE — ASSESSMENT
[FreeTextEntry1] : 69 yo female with hx T2DM controlled (HbA1c 7.9%)with cva, microalbumin and chf s/p ICD here for f/u.\par 1) T2DM: FBS at goal, continue prandin 2mg with breakfast AND dinner. Only 1 documented bs of 200 so unable to make changes based on this alone. Also her meter is over 2 years old so given a script for a new meter which way be why she is having discrepancies in her bs readings - this am she was 143 at home and then 97 here in the office. \par -check bs qdaily\par -HbA1C at goal between 7 and 8%.\par -Due for optho, has an appt on Saturday.\par -Pt refuses a flu shot as she got sick before\par 2) Hypothyroidism: TSH goal: 0.2-3. Continue Synthroid.\par 3) HTN: continue current regimen, at goal, less than 140/90. \par 4) Dyslipidemia: statin tx, Crestor 20mg po qhs.\par 5) Insomnia: start on remeron 7.5mg po qhs to help with sleep.\par 6) Health maintenance: DXA today. Pt has to get home so unable to get labs today. \par RTC in 3 months\par CC: Dr. Esqueda and Dr. Patel.

## 2020-11-12 NOTE — HISTORY OF PRESENT ILLNESS
[FreeTextEntry1] : 69 yo female with hx of t2dm controlled (HbA1c %) x 30 years with neuropathy, microalbuminuria, cad and cva here for f/u. Her son called me the other day and told me that hr bs are in the 200s. She notes that her life is stressful but she won't tell me what is happening. Her son told me that his sister is moving out which has been stressing is mom out. For the past 2 weeks she has been having issues with insomnia. She falls asleep about 11pm and awakens at 330/4am and can't go back to sleep. Some days she has to take a nap in the day time. She endorses depression and anxiety in regards to her heart. Her appetite has been ok but she sometimes does not eat to avoid hyperglycemia. She denies S/HI. She has not seen her PCP to discuss things.  \par Her weight has been stable between 153-155 pounds.\par BS - did not bring meter but has her book in which she does not record the elevated numbers\par 11/12 am-143\par 11/8 am-144\par 11/6 am-112\par 11/5 am 200\par 11/4 am-135\par 11/2 am-140\par 11/1 am-138\par \par \par PCP: Dr. Fields\par Cards: Dr. Patel\par ENT: Dr. Veliz\par Pulmonary: Dr. Ceron\par CHF: Dr. Swapna Knutson

## 2020-11-12 NOTE — PHYSICAL EXAM
[Alert] : alert [Well Nourished] : well nourished [No Acute Distress] : no acute distress [No Respiratory Distress] : no respiratory distress [No Accessory Muscle Use] : no accessory muscle use [Clear to Auscultation] : lungs were clear to auscultation bilaterally [Normal S1, S2] : normal S1 and S2 [Normal Rate] : heart rate was normal [Regular Rhythm] : with a regular rhythm [Normal Bowel Sounds] : normal bowel sounds [Not Tender] : non-tender [Not Distended] : not distended [Soft] : abdomen soft [Right Foot Was Examined] : right foot ~C was examined [Left Foot Was Examined] : left foot ~C was examined [Normal] : normal [2+] : 2+ in the dorsalis pedis [Diminished Throughout Both Feet] : normal tactile sensation with monofilament testing throughout both feet

## 2020-12-23 ENCOUNTER — NON-APPOINTMENT (OUTPATIENT)
Age: 70
End: 2020-12-23

## 2021-04-21 ENCOUNTER — RX RENEWAL (OUTPATIENT)
Age: 71
End: 2021-04-21

## 2021-05-13 ENCOUNTER — APPOINTMENT (OUTPATIENT)
Dept: ENDOCRINOLOGY | Facility: CLINIC | Age: 71
End: 2021-05-13
Payer: MEDICARE

## 2021-05-13 VITALS
OXYGEN SATURATION: 99 % | TEMPERATURE: 97.3 F | WEIGHT: 159 LBS | DIASTOLIC BLOOD PRESSURE: 88 MMHG | HEART RATE: 65 BPM | SYSTOLIC BLOOD PRESSURE: 130 MMHG | HEIGHT: 61 IN | BODY MASS INDEX: 30.02 KG/M2

## 2021-05-13 DIAGNOSIS — R41.3 OTHER AMNESIA: ICD-10-CM

## 2021-05-13 LAB
CREAT SPEC-SCNC: 74 MG/DL
GLUCOSE BLDC GLUCOMTR-MCNC: 167
HBA1C MFR BLD HPLC: 9.4
MICROALBUMIN 24H UR DL<=1MG/L-MCNC: 28.5 MG/DL
MICROALBUMIN/CREAT 24H UR-RTO: 383 MG/G

## 2021-05-13 PROCEDURE — 99072 ADDL SUPL MATRL&STAF TM PHE: CPT

## 2021-05-13 PROCEDURE — 83036 HEMOGLOBIN GLYCOSYLATED A1C: CPT | Mod: QW

## 2021-05-13 PROCEDURE — 82962 GLUCOSE BLOOD TEST: CPT

## 2021-05-13 PROCEDURE — 99214 OFFICE O/P EST MOD 30 MIN: CPT | Mod: 25

## 2021-05-13 RX ORDER — SITAGLIPTIN 25 MG/1
25 TABLET, FILM COATED ORAL
Qty: 90 | Refills: 1 | Status: ACTIVE | COMMUNITY
Start: 2021-05-13 | End: 1900-01-01

## 2021-05-13 NOTE — PHYSICAL EXAM
[Alert] : alert [Well Nourished] : well nourished [No Acute Distress] : no acute distress [Thyroid Not Enlarged] : the thyroid was not enlarged [No Thyroid Nodules] : no palpable thyroid nodules [No Accessory Muscle Use] : no accessory muscle use [No Respiratory Distress] : no respiratory distress [Clear to Auscultation] : lungs were clear to auscultation bilaterally [Normal Bowel Sounds] : normal bowel sounds [Not Tender] : non-tender [Not Distended] : not distended [Soft] : abdomen soft [Right Foot Was Examined] : right foot ~C was examined [Left Foot Was Examined] : left foot ~C was examined [Swelling] : swollen [1+] : 1+ in the dorsalis pedis [#9 Diminished] : number 9 was diminished [Normal S1, S2] : normal S1 and S2 [No Murmurs] : no murmurs [Normal Rate] : heart rate was normal [Regular Rhythm] : with a regular rhythm [Full ROM] : with limited range of motion [Tenderness] : not tender [Erythema] : not erythematous [#8 Diminished] : number 8 was normal [#10 Diminished] : number 10 was normal [#1 Diminished] : number 1 was normal [#2 Diminished] : number 2 was normal [#3 Diminished] : number 3 was normal [#4 Diminished] : number 4 was normal [#5 Diminished] : number 5 was normal [#6 Diminished] : number 6 was normal [#7 Diminished] : number 7 was normal

## 2021-05-13 NOTE — HISTORY OF PRESENT ILLNESS
[FreeTextEntry1] : 70 yo female with hx of t2dm controlled (HbA1c 9.4%) x 30 years with neuropathy, CKD4/microalbuminuria, cad and cva here for f/u. She is feeling down as she has a lot of pain - her stomach and everywhere. She thinks that it began after her valve clipping. She tries to exercise but she can't bring herself to do it. She has issues with anxiety which is causing insomnia. She feels like she needs help with her medications. Also both of her daughters have moved away so she is having a hard time adjusting. She denies S/HI, but would not be upset if God took her. \par \par She saw the dentist on Friday and was told that she needs a tooth extraction. She denies any pain in the area. She has decreased her PO intake to avoid making it worse. For b'fast at 9am she has oatmeal and orange, takes one pill. 2pm-veggies so she does not take it. dinner- takes 1 pill. \par \par BS - did not bring meter or her book but notes that she has seen BS in the 300s. She notes that she is does not take her medications on time. She saw her PCP last week and he did labs but she never got her results. \par \par PCP: Dr. Fields\par Cards: Dr. Patel\par ENT: Dr. Veliz\par Pulmonary: Dr. Ceron\par CHF: Dr. Swapna Knutson\par Renal: ?name

## 2021-05-13 NOTE — ASSESSMENT
[FreeTextEntry1] : 70 yo female with hx T2DM uncontrolled (HbA1c 9.4%)with cva, CKD4/microalbumin and chf s/p ICD here for f/u.\par 1) T2DM: Pt with CKD 4 so limited in what can be used: add januvia 25mg po qdaily and continue prandin 2mg po with meals. If she has a light meal, she can take a half.\par -check bs qdaily\par -Optho f/u\par -Up to date with COVID vaccine.\par -Pt with urine microalbumin but has CKD 4, will reach out to her nephrologist to see if she can use a low dose ACEi or ARB. She could not give me the name of her kidney doctor nor did she have her complete medication dose. Asked her  to call back with this info but he did not. Asked MOA to call to f/u. Other option would be a GLP1RA. \par 2) Hypothyroidism: Continue Synthroid.\par 3) HTN: continue current regimen, at goal, less than 140/90. \par 4) Dyslipidemia: statin tx.\par 5) Memory Loss: patient needs to see neurology. Gave her a referral fro Dr. Salvador Brown, but her son says that she can resume seeing Dr. Chiang.\par 6) Health maintenance: Next DXA 11/22.\par Refer to geriatrics - Dr. Marcus as patient would benefit from access to \par RTC in 3-4 months\par

## 2021-06-08 ENCOUNTER — NON-APPOINTMENT (OUTPATIENT)
Age: 71
End: 2021-06-08

## 2021-07-06 ENCOUNTER — RX RENEWAL (OUTPATIENT)
Age: 71
End: 2021-07-06

## 2021-07-07 ENCOUNTER — NON-APPOINTMENT (OUTPATIENT)
Age: 71
End: 2021-07-07

## 2021-08-20 ENCOUNTER — NON-APPOINTMENT (OUTPATIENT)
Age: 71
End: 2021-08-20

## 2021-08-21 ENCOUNTER — RX RENEWAL (OUTPATIENT)
Age: 71
End: 2021-08-21

## 2021-09-23 ENCOUNTER — APPOINTMENT (OUTPATIENT)
Dept: ENDOCRINOLOGY | Facility: CLINIC | Age: 71
End: 2021-09-23

## 2021-10-11 NOTE — H&P ADULT - NSHPREVIEWOFSYSTEMS_GEN_ALL_CORE
[Patient] : patient
ROS:    Constitutional: [ ] fevers [ ] chills [ ] weight loss [ ] weight gain  HEENT: [ ] dry eyes [ ] eye irritation [ ] postnasal drip [ ] nasal congestion  CV: [x ] chest pain [ ] orthopnea [ ] palpitations [ ] murmur  Resp: [ ] cough [x ] shortness of breath [ ] dyspnea [ ] wheezing [ ] sputum [ ] hemoptysis  GI: [ ] nausea [ x] vomiting [ ] diarrhea [ ] constipation [ ] abd pain [ ] dysphagia   : [ ] dysuria [ ] nocturia [ ] hematuria [ ] increased urinary frequency  Musculoskeletal: [ ] back pain [ ] myalgias [ ] arthralgias [ ] fracture  Skin: [ ] rash [ ] itch  Neurological: [ ] headache [ ] dizziness [ ] syncope [ ] weakness [ ] numbness  Psychiatric: [ ] anxiety [ ] depression  Endocrine: [ ] diabetes [ ] thyroid problem  Hematologic/Lymphatic: [ ] anemia [ ] bleeding problem  Allergic/Immunologic: [ ] itchy eyes [ ] nasal discharge [ ] hives [ ] angioedema  [x ] All other systems negative

## 2022-02-07 NOTE — H&P ADULT - NSHPSOCIALHISTORY_GEN_ALL_CORE
Social History:    Marital Status: ( x ) , (  ) Single, (  ) , (  ) , (  )   # of Children: 1 son, 2 daughters  Lives with: (  ) alone, (  ) children, ( x ) spouse, (  ) parents, (  ) siblings, (  ) friends, (  ) other:   Occupation:     Substance Use/Illicit Drugs: (  ) never used vs other:   Tobacco Usage: ( x ) never smoked, (  ) former smoker, (  ) current smoker and Total Pack-Years:   Last Alcohol Usage/Frequency/Amount/Withdrawal/Hx of Abuse:  none  Foreign travel/Animal exposure: Yes

## 2024-01-07 NOTE — ED PROVIDER NOTE - ENMT, MLM
Upper respiratory virus
Airway patent, Nasal mucosa clear. Mouth with normal mucosa. Throat has no vesicles, no oropharyngeal exudates and uvula is midline.

## 2024-01-09 NOTE — CONSULT NOTE ADULT - ATTENDING COMMENTS
Patient seen and examined.  Agree with above.   -Admitted with chest burning found to have elevated cardiac enzymes likely secondary to NSTEMI  -Pt. currently chest pain free with no high risk features  -Pt. with possible EDUARDO with Cr of 2.6 - renal consult called with Dr. Betancourt  -Pt. currently refusing any ischemic evaluation at this time and wants to be transferred to [place]  -Will continue with hep gtt and dapt for presumed NSTEMI  -Check TTE  -Further workup pending pt. decision and medical and renal optimization for cardiac cath    Raul Rivers MD
discussed with patient in detail, expresses understanding of treatment plans.
No

## 2025-02-01 NOTE — ED ADULT NURSE NOTE - ED STAT RN HANDOFF DETAILS
present x 4 quadrants Report given to DEDE Pardo. Pt a&ox4 and ambulatory with assistance. Pt appears to be resting comfortably in stretcher and denies pain or discomfort. pt respirations even and unlabored. pt in A-fib on the monitor. Vital signs as noted, will continue to monitor till transport.

## 2025-03-11 NOTE — DISCHARGE NOTE NURSING/CASE MANAGEMENT/SOCIAL WORK - PATIENT PORTAL LINK FT
Low Acute Suicide Risk You can access the FollowMyHealth Patient Portal offered by [facility] by registering at the following website: [email or web address]. By joining [email or web address]’s FollowMyHealth portal, you will also be able to view your health information using other applications (apps) compatible with our system.